# Patient Record
Sex: FEMALE | Race: ASIAN | NOT HISPANIC OR LATINO | URBAN - METROPOLITAN AREA
[De-identification: names, ages, dates, MRNs, and addresses within clinical notes are randomized per-mention and may not be internally consistent; named-entity substitution may affect disease eponyms.]

---

## 2023-03-08 ENCOUNTER — EMERGENCY (EMERGENCY)
Facility: HOSPITAL | Age: 33
LOS: 1 days | Discharge: STILL ADMITTED OR ADMINISTRATIVE | End: 2023-03-08
Admitting: EMERGENCY MEDICINE
Payer: SELF-PAY

## 2023-03-08 ENCOUNTER — OUTPATIENT (OUTPATIENT)
Dept: INPATIENT UNIT | Facility: HOSPITAL | Age: 33
LOS: 1 days | Discharge: HOME | End: 2023-03-08
Payer: MEDICAID

## 2023-03-08 ENCOUNTER — ASOB RESULT (OUTPATIENT)
Age: 33
End: 2023-03-08

## 2023-03-08 ENCOUNTER — APPOINTMENT (OUTPATIENT)
Dept: ANTEPARTUM | Facility: CLINIC | Age: 33
End: 2023-03-08
Payer: MEDICAID

## 2023-03-08 VITALS — HEART RATE: 121 BPM | OXYGEN SATURATION: 99 %

## 2023-03-08 VITALS
RESPIRATION RATE: 16 BRPM | TEMPERATURE: 99 F | HEART RATE: 127 BPM | DIASTOLIC BLOOD PRESSURE: 70 MMHG | OXYGEN SATURATION: 100 % | SYSTOLIC BLOOD PRESSURE: 115 MMHG

## 2023-03-08 VITALS
SYSTOLIC BLOOD PRESSURE: 129 MMHG | RESPIRATION RATE: 18 BRPM | HEART RATE: 100 BPM | OXYGEN SATURATION: 100 % | TEMPERATURE: 98 F | DIASTOLIC BLOOD PRESSURE: 80 MMHG

## 2023-03-08 DIAGNOSIS — O26.899 OTHER SPECIFIED PREGNANCY RELATED CONDITIONS, UNSPECIFIED TRIMESTER: ICD-10-CM

## 2023-03-08 DIAGNOSIS — Z90.49 ACQUIRED ABSENCE OF OTHER SPECIFIED PARTS OF DIGESTIVE TRACT: Chronic | ICD-10-CM

## 2023-03-08 DIAGNOSIS — Z98.891 HISTORY OF UTERINE SCAR FROM PREVIOUS SURGERY: Chronic | ICD-10-CM

## 2023-03-08 LAB
ALBUMIN SERPL ELPH-MCNC: 3.7 G/DL — SIGNIFICANT CHANGE UP (ref 3.3–5)
ALP SERPL-CCNC: 88 U/L — SIGNIFICANT CHANGE UP (ref 40–120)
ALT FLD-CCNC: 19 U/L — SIGNIFICANT CHANGE UP (ref 4–33)
AMYLASE P1 CFR SERPL: 57 U/L — SIGNIFICANT CHANGE UP (ref 25–125)
ANION GAP SERPL CALC-SCNC: 12 MMOL/L — SIGNIFICANT CHANGE UP (ref 7–14)
APPEARANCE UR: ABNORMAL
APTT BLD: 29.9 SEC — SIGNIFICANT CHANGE UP (ref 27–36.3)
AST SERPL-CCNC: 25 U/L — SIGNIFICANT CHANGE UP (ref 4–32)
BACTERIA # UR AUTO: ABNORMAL
BASOPHILS # BLD AUTO: 0.03 K/UL — SIGNIFICANT CHANGE UP (ref 0–0.2)
BASOPHILS NFR BLD AUTO: 0.4 % — SIGNIFICANT CHANGE UP (ref 0–2)
BILIRUB SERPL-MCNC: 0.3 MG/DL — SIGNIFICANT CHANGE UP (ref 0.2–1.2)
BILIRUB UR-MCNC: ABNORMAL
BUN SERPL-MCNC: 11 MG/DL — SIGNIFICANT CHANGE UP (ref 7–23)
CALCIUM SERPL-MCNC: 8.9 MG/DL — SIGNIFICANT CHANGE UP (ref 8.4–10.5)
CHLORIDE SERPL-SCNC: 99 MMOL/L — SIGNIFICANT CHANGE UP (ref 98–107)
CO2 SERPL-SCNC: 22 MMOL/L — SIGNIFICANT CHANGE UP (ref 22–31)
COLOR SPEC: YELLOW — SIGNIFICANT CHANGE UP
CREAT ?TM UR-MCNC: 315 MG/DL — SIGNIFICANT CHANGE UP
CREAT SERPL-MCNC: 0.54 MG/DL — SIGNIFICANT CHANGE UP (ref 0.5–1.3)
DIFF PNL FLD: NEGATIVE — SIGNIFICANT CHANGE UP
EGFR: 125 ML/MIN/1.73M2 — SIGNIFICANT CHANGE UP
EOSINOPHIL # BLD AUTO: 0 K/UL — SIGNIFICANT CHANGE UP (ref 0–0.5)
EOSINOPHIL NFR BLD AUTO: 0 % — SIGNIFICANT CHANGE UP (ref 0–6)
EPI CELLS # UR: 10 /HPF — HIGH (ref 0–5)
FIBRINOGEN PPP-MCNC: 585 MG/DL — HIGH (ref 200–465)
GLUCOSE SERPL-MCNC: 104 MG/DL — HIGH (ref 70–99)
GLUCOSE UR QL: NEGATIVE — SIGNIFICANT CHANGE UP
HCT VFR BLD CALC: 36.5 % — SIGNIFICANT CHANGE UP (ref 34.5–45)
HGB BLD-MCNC: 11.9 G/DL — SIGNIFICANT CHANGE UP (ref 11.5–15.5)
HYALINE CASTS # UR AUTO: 13 /LPF — HIGH (ref 0–7)
IANC: 6.09 K/UL — SIGNIFICANT CHANGE UP (ref 1.8–7.4)
IMM GRANULOCYTES NFR BLD AUTO: 1.2 % — HIGH (ref 0–0.9)
INR BLD: 1.08 RATIO — SIGNIFICANT CHANGE UP (ref 0.88–1.16)
KETONES UR-MCNC: ABNORMAL
LDH SERPL L TO P-CCNC: 138 U/L — SIGNIFICANT CHANGE UP (ref 135–225)
LEUKOCYTE ESTERASE UR-ACNC: NEGATIVE — SIGNIFICANT CHANGE UP
LIDOCAIN IGE QN: 29 U/L — SIGNIFICANT CHANGE UP (ref 7–60)
LYMPHOCYTES # BLD AUTO: 0.9 K/UL — LOW (ref 1–3.3)
LYMPHOCYTES # BLD AUTO: 12.2 % — LOW (ref 13–44)
MCHC RBC-ENTMCNC: 26.7 PG — LOW (ref 27–34)
MCHC RBC-ENTMCNC: 32.6 GM/DL — SIGNIFICANT CHANGE UP (ref 32–36)
MCV RBC AUTO: 82 FL — SIGNIFICANT CHANGE UP (ref 80–100)
MONOCYTES # BLD AUTO: 0.25 K/UL — SIGNIFICANT CHANGE UP (ref 0–0.9)
MONOCYTES NFR BLD AUTO: 3.4 % — SIGNIFICANT CHANGE UP (ref 2–14)
NEUTROPHILS # BLD AUTO: 6.09 K/UL — SIGNIFICANT CHANGE UP (ref 1.8–7.4)
NEUTROPHILS NFR BLD AUTO: 82.8 % — HIGH (ref 43–77)
NITRITE UR-MCNC: NEGATIVE — SIGNIFICANT CHANGE UP
NRBC # BLD: 0 /100 WBCS — SIGNIFICANT CHANGE UP (ref 0–0)
NRBC # FLD: 0 K/UL — SIGNIFICANT CHANGE UP (ref 0–0)
PH UR: 6 — SIGNIFICANT CHANGE UP (ref 5–8)
PLATELET # BLD AUTO: 266 K/UL — SIGNIFICANT CHANGE UP (ref 150–400)
POTASSIUM SERPL-MCNC: 4 MMOL/L — SIGNIFICANT CHANGE UP (ref 3.5–5.3)
POTASSIUM SERPL-SCNC: 4 MMOL/L — SIGNIFICANT CHANGE UP (ref 3.5–5.3)
PROT ?TM UR-MCNC: 55 MG/DL — SIGNIFICANT CHANGE UP
PROT ?TM UR-MCNC: 55 MG/DL — SIGNIFICANT CHANGE UP
PROT SERPL-MCNC: 7.6 G/DL — SIGNIFICANT CHANGE UP (ref 6–8.3)
PROT UR-MCNC: ABNORMAL
PROT/CREAT UR-RTO: 0.2 RATIO — SIGNIFICANT CHANGE UP (ref 0–0.2)
PROTHROM AB SERPL-ACNC: 12.5 SEC — SIGNIFICANT CHANGE UP (ref 10.5–13.4)
RBC # BLD: 4.45 M/UL — SIGNIFICANT CHANGE UP (ref 3.8–5.2)
RBC # FLD: 13.9 % — SIGNIFICANT CHANGE UP (ref 10.3–14.5)
RBC CASTS # UR COMP ASSIST: 6 /HPF — HIGH (ref 0–4)
SODIUM SERPL-SCNC: 133 MMOL/L — LOW (ref 135–145)
SP GR SPEC: 1.03 — SIGNIFICANT CHANGE UP (ref 1.01–1.05)
URATE SERPL-MCNC: 5.6 MG/DL — SIGNIFICANT CHANGE UP (ref 2.5–7)
UROBILINOGEN FLD QL: ABNORMAL
WBC # BLD: 7.36 K/UL — SIGNIFICANT CHANGE UP (ref 3.8–10.5)
WBC # FLD AUTO: 7.36 K/UL — SIGNIFICANT CHANGE UP (ref 3.8–10.5)
WBC UR QL: 6 /HPF — HIGH (ref 0–5)

## 2023-03-08 PROCEDURE — 99222 1ST HOSP IP/OBS MODERATE 55: CPT

## 2023-03-08 PROCEDURE — 93010 ELECTROCARDIOGRAM REPORT: CPT

## 2023-03-08 PROCEDURE — 76817 TRANSVAGINAL US OBSTETRIC: CPT | Mod: 26

## 2023-03-08 PROCEDURE — L9996: CPT

## 2023-03-08 PROCEDURE — 76815 OB US LIMITED FETUS(S): CPT | Mod: 26

## 2023-03-08 RX ORDER — SIMETHICONE 80 MG/1
160 TABLET, CHEWABLE ORAL ONCE
Refills: 0 | Status: COMPLETED | OUTPATIENT
Start: 2023-03-08 | End: 2023-03-08

## 2023-03-08 RX ORDER — SODIUM CHLORIDE 9 MG/ML
1000 INJECTION, SOLUTION INTRAVENOUS ONCE
Refills: 0 | Status: DISCONTINUED | OUTPATIENT
Start: 2023-03-08 | End: 2023-03-22

## 2023-03-08 RX ORDER — ONDANSETRON 8 MG/1
4 TABLET, FILM COATED ORAL ONCE
Refills: 0 | Status: COMPLETED | OUTPATIENT
Start: 2023-03-08 | End: 2023-03-08

## 2023-03-08 RX ORDER — SODIUM CHLORIDE 9 MG/ML
500 INJECTION, SOLUTION INTRAVENOUS
Refills: 0 | Status: DISCONTINUED | OUTPATIENT
Start: 2023-03-08 | End: 2023-03-08

## 2023-03-08 RX ORDER — SODIUM CHLORIDE 9 MG/ML
500 INJECTION, SOLUTION INTRAVENOUS ONCE
Refills: 0 | Status: COMPLETED | OUTPATIENT
Start: 2023-03-08 | End: 2023-03-08

## 2023-03-08 RX ORDER — ACETAMINOPHEN 500 MG
1000 TABLET ORAL ONCE
Refills: 0 | Status: COMPLETED | OUTPATIENT
Start: 2023-03-08 | End: 2023-03-08

## 2023-03-08 RX ORDER — FAMOTIDINE 10 MG/ML
20 INJECTION INTRAVENOUS ONCE
Refills: 0 | Status: COMPLETED | OUTPATIENT
Start: 2023-03-08 | End: 2023-03-08

## 2023-03-08 RX ADMIN — FAMOTIDINE 20 MILLIGRAM(S): 10 INJECTION INTRAVENOUS at 14:16

## 2023-03-08 RX ADMIN — SIMETHICONE 160 MILLIGRAM(S): 80 TABLET, CHEWABLE ORAL at 13:34

## 2023-03-08 RX ADMIN — Medication 1000 MILLIGRAM(S): at 14:16

## 2023-03-08 RX ADMIN — SODIUM CHLORIDE 1000 MILLILITER(S): 9 INJECTION, SOLUTION INTRAVENOUS at 12:44

## 2023-03-08 RX ADMIN — ONDANSETRON 4 MILLIGRAM(S): 8 TABLET, FILM COATED ORAL at 12:39

## 2023-03-08 NOTE — OB PROVIDER TRIAGE NOTE - NSPRENATALCARE_OBGYN_ALL_OB
PO#1  HVT  States doing better. Will try ambulation. Lumbar drain working well.   Will increase CSF drainage to 7.5 ml/hr Yes

## 2023-03-08 NOTE — ED ADULT TRIAGE NOTE - ARRIVAL FROM
Introductory lactation consult with Nick, Aly and baby Kaz for first feeding at breast. Nick has been pumping and manually expressing since delivery and expressing good volumes of as much as 7ml. Kaz latched eagerly off and on with assistance by LC for latching for the first half of the feeding gradually becoming more organized and independently latching with mother's assistance. He was awake and alert for almost the entire feeding. Parents expressed desire to be sure Aly gets some of the milk Nick has expressed at this time, therefore writer assisted with syringing drops of milk at breast throughout feeding, which stimulated long suckling bursts.  Discussed typical expectations and recommendations, answering many questions. Aly very attentive and asking many appropriate questions as well as providing hands on help during feeding for Nick. See Infant education record.  Provided Lactation Support Services introductory packet with Lactation Consultant contact information. Encouraged to continue pumping following feedings to assure of adequate breast milk supply stimulation until infant is latching more consistently. Encouraged to call with questions, concerns or for assistance with feedings as needed or desired.  Luiz Roblero RN, IBCLC    1345 Returned to room briefly to review Lactation Services packet, Infant Driven Feeding and assess if they have any further questions. Reviewed with Aly as Nick was in the bathroom. Also confirmed that Nick has a good breast pump for homoe use.  Writer will follow-up tomorrow to discuss further.    Home

## 2023-03-08 NOTE — ED ADULT TRIAGE NOTE - CHIEF COMPLAINT QUOTE
Pt states she is 21 weeks pregnant complaining of back and abdominal pain with vomiting. Pt denies chest pain, sob, fever or chills.

## 2023-03-08 NOTE — OB PROVIDER TRIAGE NOTE - ADDITIONAL INSTRUCTIONS
33y/o  21 2/ female with dehydration, ruled out for acute abdomen, gastroenteritis, PEC, UTI, discharged to home.  - Dr. Harvey discussed with Dr. Whittington.  - Patient to be discharged home with follow up and return precautions  - Please follow up with your obstetrician at your next scheduled appointment 3/21.  - Please return for decreased/no fetal movement, vaginal bleeding similar to that of a period, leaking/gush of fluid, regular contractions.  - Patient and partner and educated of plan and demonstrate understanding. All questions answered. Discharge instructions provided and signed.  - urine culture sent, result to be reviewed with patient   - Discharged at 1507

## 2023-03-08 NOTE — OB PROVIDER TRIAGE NOTE - HISTORY OF PRESENT ILLNESS
31y/o  at 21.2 weeks presents with c/o back pain, abdominal pain, and x3 episodes of vomiting since last nite. Pt reports 9/10 on pain scale, denies need for pain management currently. Pt denies taking medication for pain. Pt reports x1 episode chills last night. Pt last ate burger and fries yesterday afternoon. Pt denies fever, palpitations, dysuria, hematuria, urinary frequency. Denies headache, dizziness, visual changes. Denies lof, vb, reports positive fm.  -Pt receives PNC with Dr. Shelli Snyder in [place].    AP course:  - GDMA2- metformin 500mg BID (took yesterday afternoon) 31y/o  at 21.2 weeks presents with c/o back pain, abdominal pain, and x3 episodes of vomiting since last nite. Pt reports 9/10 on pain scale, denies need for pain management currently. Pt denies taking medication for pain. Pt reports x1 episode chills last night. Pt last ate burger and fries yesterday afternoon. Pt denies fever, diarrhea, palpitations, dysuria, hematuria, urinary frequency. Pt able to pass gas w/o difficulty. Denies headache, dizziness, visual changes. Denies lof, vb, reports positive fm.  -Pt receives PNC with Dr. Shelli Snyder in [place].    AP course:  - GDMA2- metformin 500mg BID (took yesterday afternoon) Prenatal care: Dr. Shelli Snyder in [place].    33y/o  at 21.2 weeks presents with c/o back pain, abdominal pain, and x3 episodes of vomiting since last nite. Pt reports 9/10 on pain scale, denies need for pain management currently. Pt denies taking medication for pain. Pt reports x1 episode chills last night. Pt last ate burger and fries yesterday afternoon. Pt denies fever, diarrhea, palpitations, dysuria, hematuria, urinary frequency. Pt able to pass gas w/o difficulty. Denies headache, dizziness, visual changes. Denies lof, vb, reports positive fm.    AP course:  - GDMA2- metformin 500mg BID (took yesterday afternoon)

## 2023-03-08 NOTE — OB PROVIDER TRIAGE NOTE - NSOBPROVIDERNOTE_OBGYN_ALL_OB_FT
33 y/o  at 21.2 weeks, for r/o PTL.  -r/o dehydration  -r/o acute abdomen  -r/o gastroenteritis  -r/o PEC  -r/o UTI    -UA  -EKG reveals sinus tachycardia  -LR Bolus 500cc  -Zofran  -HELLP labs  -amylase/lipase 31 y/o  at 21.2 weeks with cramping.  -r/o dehydration  -r/o acute abdomen  -r/o gastroenteritis  -r/o PEC  -r/o UTI    -no evidence of pre term labor    -UA  -EKG reveals sinus tachycardia  -LR Bolus 500cc  -Zofran  -HELLP labs  -amylase/lipase  -bp monitoring 31 y/o  at 21.2 weeks with cramping.  -r/o dehydration  -r/o acute abdomen  -r/o gastroenteritis  -r/o PEC  -r/o UTI    -no evidence of pre term labor    -UA  -EKG reveals sinus tachycardia  -LR Bolus 500cc  -Zofran  -HELLP labs  -amylase/lipase  -bp monitoring    -pt reports sharp pain under left breast.  -for simethicone 31 y/o  at 21.2 weeks with cramping.  -r/o dehydration  -r/o acute abdomen  -r/o gastroenteritis  -r/o PEC  -r/o UTI    -no evidence of pre term labor    -UA/UC  -EKG reveals sinus tachycardia  -LR Bolus 500cc  -Zofran  -HELLP labs  -amylase/lipase  -bp monitoring    -pt reports sharp pain under left breast; in no acute distress  -for simethicone  -for PO Challenge 33 y/o  at 21.2 weeks with cramping.  -r/o dehydration  -r/o acute abdomen  -r/o gastroenteritis  -r/o PEC  -r/o UTI    -no evidence of pre term labor or acute abdomen.    -UA/UC  -EKG reveals sinus tachycardia  -LR Bolus 500cc  -Zofran  -HELLP labs  -amylase/lipase  -bp monitoring    -pt reports sharp pain under left breast; in no acute distress  -for simethicone    Pt presented to [facility] (PGY-3)  -pain 6/10 with improvement  -for pepcid, tylenol  -for PO Challenge  -plan for d.c when s/s improve.  -[facility] d/w Dr. Whittington. 31 y/o  at 21.2 weeks with cramping and tachycardia.  -r/o dehydration  -r/o acute abdomen  -r/o gastroenteritis  -r/o PEC  -r/o UTI    -no evidence of pre term labor or acute abdomen.  -likely dehydration    -UA/UC  -EKG reveals sinus tachycardia  -LR Bolus 500cc  -Zofran  -HELLP labs  -amylase/lipase  -bp monitoring    -pt reports sharp pain under left breast; in no acute distress  -for simethicone    Pt presented to [facility] (PGY-3)  -pain 6/10 with improvement  -for pepcid, tylenol  -for PO Challenge  -plan for d.c home with f/u, when s/s improve.  -MD [facility] d/w Dr. Whittington. 33y/o  female is to be ruled out for pre term labor, acute abdomen, gastroenteritis, PEC, UTI  -UA/UC  -EKG reveals sinus tachycardia  -LR Bolus 500cc  -Zofran, nausea improved  -HELLP labs, normal findings  -blood pressure monitoring, within normal range  -amylase/lipase    1334 Patient reports sharp pain under left breast; in no acute distress  -for simethicone, patient reports of relief after medication administration    1416 Pt assessment, presentation and results to  (PGY-3)  -for pepcid, tylenol  -for PO challenge, patient able to tolerate solid  -plan for d.c home with f/u, when s/s improve.    33y/o  female with dehydration, ruled out for acute abdomen, gastroenteritis, PEC, UTI, discharged to home.  - Dr. Harvey discussed with Dr. Whittington.  - Patient to be discharged home with follow up and return precautions  - Please follow up with your obstetrician at your next scheduled appointment 3/21.  - Please return for decreased/no fetal movement, vaginal bleeding similar to that of a period, leaking/gush of fluid, regular contractions.  - Patient and partner and educated of plan and demonstrate understanding. All questions answered. Discharge instructions provided and signed.   - urine culture sent, result to be reviewed with patient  - Discharged at 1507

## 2023-03-08 NOTE — OB PROVIDER TRIAGE NOTE - NSHPPHYSICALEXAM_GEN_ALL_CORE
Vital Signs Last 24 Hrs  T(C): 37.0 (08 Mar 2023 11:27), Max: 37 (08 Mar 2023 10:58)  T(F): 98.6 (08 Mar 2023 11:27), Max: 98.6 (08 Mar 2023 10:58)  HR: 121 (08 Mar 2023 12:25) (100 - 148)  BP: 116/73 (08 Mar 2023 12:13) (109/74 - 129/80)  BP(mean): --  RR: 16 (08 Mar 2023 10:58) (16 - 18)  SpO2: 100% (08 Mar 2023 12:25) (93% - 100%)    abdomen soft, nontender  negative B/L CVA tenderness  TAS:  SSE:  TVS: Vital Signs Last 24 Hrs  T(C): 37.0 (08 Mar 2023 11:27), Max: 37 (08 Mar 2023 10:58)  T(F): 98.6 (08 Mar 2023 11:27), Max: 98.6 (08 Mar 2023 10:58)  HR: 121 (08 Mar 2023 12:25) (100 - 148)  BP: 116/73 (08 Mar 2023 12:13) (109/74 - 129/80)  BP(mean): --  RR: 16 (08 Mar 2023 10:58) (16 - 18)  SpO2: 100% (08 Mar 2023 12:25) (93% - 100%)    abdomen soft, nontender  negative B/L CVA tenderness  TAS: breech presentation, anterior placenta, FH 175bpm, MVP 3.15  SSE: cervix appears closed, no vb  TVS: CL 4.5cm, no funneling. Vital Signs Last 24 Hrs  T(C): 37.0 (08 Mar 2023 11:27), Max: 37 (08 Mar 2023 10:58)  T(F): 98.6 (08 Mar 2023 11:27), Max: 98.6 (08 Mar 2023 10:58)  HR: 121 (08 Mar 2023 12:25) (100 - 148)  BP: 116/73 (08 Mar 2023 12:13) (109/74 - 129/80)  BP(mean): --  RR: 16 (08 Mar 2023 10:58) (16 - 18)  SpO2: 100% (08 Mar 2023 12:25) (93% - 100%)    abdomen soft, nontender  negative B/L CVA tenderness  TAS: report in ASOB, breech presentation, anterior placenta, FH 175bpm, MVP 3.15  SSE: cervix appears closed, no vb  TVS: CL 4.5cm, no funneling. Vital Signs Last 24 Hrs  T(C): 37.0 (08 Mar 2023 11:27), Max: 37 (08 Mar 2023 10:58)  T(F): 98.6 (08 Mar 2023 11:27), Max: 98.6 (08 Mar 2023 10:58)  HR: 121 (08 Mar 2023 15:00) (100 - 148)  BP: 109/59 (08 Mar 2023 14:53) (103/55 - 129/80)  BP(mean): --  RR: 16 (08 Mar 2023 10:58) (16 - 18)  SpO2: 99% (08 Mar 2023 14:55) (88% - 100%)    abdomen soft, nontender  negative B/L CVA tenderness  lungs clear, equal B/L  HR normal rate and rhythm.  TAS: report in ASOB, breech presentation, anterior placenta, FH 175bpm, MVP 3.15  SSE: cervix appears closed, no vb  TVS: CL 4.5cm, no funneling.  EKG: sinus tachycardia

## 2023-03-08 NOTE — OB PROVIDER TRIAGE NOTE - NSHPLABSRESULTS_GEN_ALL_CORE
EKG CBC Full  -  ( 08 Mar 2023 12:37 )  WBC Count : 7.36 K/uL  RBC Count : 4.45 M/uL  Hemoglobin : 11.9 g/dL  Hematocrit : 36.5 %  Platelet Count - Automated : 266 K/uL  Mean Cell Volume : 82.0 fL  Mean Cell Hemoglobin : 26.7 pg  Mean Cell Hemoglobin Concentration : 32.6 gm/dL  Auto Neutrophil # : 6.09 K/uL  Auto Lymphocyte # : 0.90 K/uL  Auto Monocyte # : 0.25 K/uL  Auto Eosinophil # : 0.00 K/uL  Auto Basophil # : 0.03 K/uL  Auto Neutrophil % : 82.8 %  Auto Lymphocyte % : 12.2 %  Auto Monocyte % : 3.4 %  Auto Eosinophil % : 0.0 %  Auto Basophil % : 0.4 %    Urinalysis Basic - ( 08 Mar 2023 11:55 )    Color: Yellow / Appearance: Slightly Turbid / S.035 / pH: x  Gluc: x / Ketone: Moderate  / Bili: Small / Urobili: 3 mg/dL   Blood: x / Protein: 100 mg/dL / Nitrite: Negative   Leuk Esterase: Negative / RBC: 6 /HPF / WBC 6 /HPF   Sq Epi: x / Non Sq Epi: 10 /HPF / Bacteria: Few    03-08    133<L>  |  99  |  11  ----------------------------<  104<H>  4.0   |  22  |  0.54    Ca    8.9      08 Mar 2023 12:37    TPro  7.6  /  Alb  3.7  /  TBili  0.3  /  DBili  x   /  AST  25  /  ALT  19  /  AlkPhos  88  03-08    amylase: 57   lipase: 29

## 2023-03-08 NOTE — OB RN TRIAGE NOTE - FALL HARM RISK - UNIVERSAL INTERVENTIONS
Bed in lowest position, wheels locked, appropriate side rails in place/Call bell, personal items and telephone in reach/Instruct patient to call for assistance before getting out of bed or chair/Non-slip footwear when patient is out of bed/[place] to call system/Physically safe environment - no spills, clutter or unnecessary equipment/Purposeful Proactive Rounding/Room/bathroom lighting operational, light cord in reach

## 2023-03-09 DIAGNOSIS — E86.0 DEHYDRATION: ICD-10-CM

## 2023-03-09 DIAGNOSIS — O99.282 ENDOCRINE, NUTRITIONAL AND METABOLIC DISEASES COMPLICATING PREGNANCY, SECOND TRIMESTER: ICD-10-CM

## 2023-03-09 DIAGNOSIS — O99.612 DISEASES OF THE DIGESTIVE SYSTEM COMPLICATING PREGNANCY, SECOND TRIMESTER: ICD-10-CM

## 2023-03-09 DIAGNOSIS — E03.9 HYPOTHYROIDISM, UNSPECIFIED: ICD-10-CM

## 2023-03-09 DIAGNOSIS — O24.415 GESTATIONAL DIABETES MELLITUS IN PREGNANCY, CONTROLLED BY ORAL HYPOGLYCEMIC DRUGS: ICD-10-CM

## 2023-03-09 DIAGNOSIS — R00.0 TACHYCARDIA, UNSPECIFIED: ICD-10-CM

## 2023-03-09 DIAGNOSIS — Z3A.21 21 WEEKS GESTATION OF PREGNANCY: ICD-10-CM

## 2023-03-09 DIAGNOSIS — O26.892 OTHER SPECIFIED PREGNANCY RELATED CONDITIONS, SECOND TRIMESTER: ICD-10-CM

## 2023-03-09 DIAGNOSIS — K52.9 NONINFECTIVE GASTROENTERITIS AND COLITIS, UNSPECIFIED: ICD-10-CM

## 2023-03-09 DIAGNOSIS — O99.891 OTHER SPECIFIED DISEASES AND CONDITIONS COMPLICATING PREGNANCY: ICD-10-CM

## 2023-03-09 DIAGNOSIS — M54.9 DORSALGIA, UNSPECIFIED: ICD-10-CM

## 2023-03-09 LAB
CULTURE RESULTS: SIGNIFICANT CHANGE UP
SPECIMEN SOURCE: SIGNIFICANT CHANGE UP

## 2023-04-19 PROBLEM — Z00.00 ENCOUNTER FOR PREVENTIVE HEALTH EXAMINATION: Status: ACTIVE | Noted: 2023-04-19

## 2023-05-22 NOTE — OB RN TRIAGE NOTE - NS_GESTAGE_OBGYN_ALL_OB_FT
Patient will dress upper body with minimal assistance in 2-4 sessions. Patient will feed self with supervision and set-up in 2-4 sessions.
21w2d

## 2023-06-08 PROCEDURE — 99283 EMERGENCY DEPT VISIT LOW MDM: CPT

## 2023-06-09 ENCOUNTER — OUTPATIENT (OUTPATIENT)
Dept: OUTPATIENT SERVICES | Facility: HOSPITAL | Age: 33
LOS: 1 days | End: 2023-06-09
Payer: MEDICAID

## 2023-06-09 DIAGNOSIS — Z98.891 HISTORY OF UTERINE SCAR FROM PREVIOUS SURGERY: Chronic | ICD-10-CM

## 2023-06-09 DIAGNOSIS — Z90.49 ACQUIRED ABSENCE OF OTHER SPECIFIED PARTS OF DIGESTIVE TRACT: Chronic | ICD-10-CM

## 2023-06-09 DIAGNOSIS — Z3A.00 WEEKS OF GESTATION OF PREGNANCY NOT SPECIFIED: ICD-10-CM

## 2023-06-09 DIAGNOSIS — O26.899 OTHER SPECIFIED PREGNANCY RELATED CONDITIONS, UNSPECIFIED TRIMESTER: ICD-10-CM

## 2023-06-09 PROBLEM — E03.9 HYPOTHYROIDISM, UNSPECIFIED: Chronic | Status: ACTIVE | Noted: 2023-03-08

## 2023-06-09 PROBLEM — J45.998 OTHER ASTHMA: Chronic | Status: ACTIVE | Noted: 2023-03-08

## 2023-06-09 LAB
ALBUMIN SERPL ELPH-MCNC: 2.3 G/DL — LOW (ref 3.5–5)
ALP SERPL-CCNC: 117 U/L — SIGNIFICANT CHANGE UP (ref 40–120)
ALT FLD-CCNC: 14 U/L DA — SIGNIFICANT CHANGE UP (ref 10–60)
ANION GAP SERPL CALC-SCNC: 4 MMOL/L — LOW (ref 5–17)
APPEARANCE UR: CLEAR — SIGNIFICANT CHANGE UP
APTT BLD: 33.4 SEC — SIGNIFICANT CHANGE UP (ref 27.5–35.5)
APTT BLD: SIGNIFICANT CHANGE UP SEC (ref 27.5–35.5)
AST SERPL-CCNC: 14 U/L — SIGNIFICANT CHANGE UP (ref 10–40)
BACTERIA # UR AUTO: ABNORMAL /HPF
BASOPHILS # BLD AUTO: 0.03 K/UL — SIGNIFICANT CHANGE UP (ref 0–0.2)
BASOPHILS NFR BLD AUTO: 0.3 % — SIGNIFICANT CHANGE UP (ref 0–2)
BILIRUB SERPL-MCNC: 0.2 MG/DL — SIGNIFICANT CHANGE UP (ref 0.2–1.2)
BILIRUB UR-MCNC: NEGATIVE — SIGNIFICANT CHANGE UP
BUN SERPL-MCNC: 10 MG/DL — SIGNIFICANT CHANGE UP (ref 7–18)
CALCIUM SERPL-MCNC: 8.7 MG/DL — SIGNIFICANT CHANGE UP (ref 8.4–10.5)
CHLORIDE SERPL-SCNC: 111 MMOL/L — HIGH (ref 96–108)
CO2 SERPL-SCNC: 23 MMOL/L — SIGNIFICANT CHANGE UP (ref 22–31)
COLOR SPEC: YELLOW — SIGNIFICANT CHANGE UP
CREAT ?TM UR-MCNC: 123 MG/DL — SIGNIFICANT CHANGE UP
CREAT SERPL-MCNC: 0.5 MG/DL — SIGNIFICANT CHANGE UP (ref 0.5–1.3)
DIFF PNL FLD: NEGATIVE — SIGNIFICANT CHANGE UP
EGFR: 128 ML/MIN/1.73M2 — SIGNIFICANT CHANGE UP
EOSINOPHIL # BLD AUTO: 0.16 K/UL — SIGNIFICANT CHANGE UP (ref 0–0.5)
EOSINOPHIL NFR BLD AUTO: 1.7 % — SIGNIFICANT CHANGE UP (ref 0–6)
EPI CELLS # UR: ABNORMAL /HPF
FIBRINOGEN PPP-MCNC: SIGNIFICANT CHANGE UP MG/DL (ref 200–475)
GLUCOSE BLDC GLUCOMTR-MCNC: 90 MG/DL — SIGNIFICANT CHANGE UP (ref 70–99)
GLUCOSE SERPL-MCNC: 88 MG/DL — SIGNIFICANT CHANGE UP (ref 70–99)
GLUCOSE UR QL: NEGATIVE — SIGNIFICANT CHANGE UP
HCT VFR BLD CALC: 34.7 % — SIGNIFICANT CHANGE UP (ref 34.5–45)
HGB BLD-MCNC: 10.9 G/DL — LOW (ref 11.5–15.5)
IMM GRANULOCYTES NFR BLD AUTO: 0.5 % — SIGNIFICANT CHANGE UP (ref 0–0.9)
INR BLD: 0.92 RATIO — SIGNIFICANT CHANGE UP (ref 0.88–1.16)
INR BLD: SIGNIFICANT CHANGE UP RATIO (ref 0.88–1.16)
KETONES UR-MCNC: NEGATIVE — SIGNIFICANT CHANGE UP
LDH SERPL L TO P-CCNC: 156 U/L — SIGNIFICANT CHANGE UP (ref 120–225)
LEUKOCYTE ESTERASE UR-ACNC: ABNORMAL
LYMPHOCYTES # BLD AUTO: 2.57 K/UL — SIGNIFICANT CHANGE UP (ref 1–3.3)
LYMPHOCYTES # BLD AUTO: 27.3 % — SIGNIFICANT CHANGE UP (ref 13–44)
MCHC RBC-ENTMCNC: 27.2 PG — SIGNIFICANT CHANGE UP (ref 27–34)
MCHC RBC-ENTMCNC: 31.4 GM/DL — LOW (ref 32–36)
MCV RBC AUTO: 86.5 FL — SIGNIFICANT CHANGE UP (ref 80–100)
MONOCYTES # BLD AUTO: 0.52 K/UL — SIGNIFICANT CHANGE UP (ref 0–0.9)
MONOCYTES NFR BLD AUTO: 5.5 % — SIGNIFICANT CHANGE UP (ref 2–14)
NEUTROPHILS # BLD AUTO: 6.08 K/UL — SIGNIFICANT CHANGE UP (ref 1.8–7.4)
NEUTROPHILS NFR BLD AUTO: 64.7 % — SIGNIFICANT CHANGE UP (ref 43–77)
NITRITE UR-MCNC: NEGATIVE — SIGNIFICANT CHANGE UP
NRBC # BLD: 0 /100 WBCS — SIGNIFICANT CHANGE UP (ref 0–0)
PH UR: 6 — SIGNIFICANT CHANGE UP (ref 5–8)
PLATELET # BLD AUTO: 195 K/UL — SIGNIFICANT CHANGE UP (ref 150–400)
POTASSIUM SERPL-MCNC: 4 MMOL/L — SIGNIFICANT CHANGE UP (ref 3.5–5.3)
POTASSIUM SERPL-SCNC: 4 MMOL/L — SIGNIFICANT CHANGE UP (ref 3.5–5.3)
PROT ?TM UR-MCNC: 24 MG/DL — HIGH (ref 0–12)
PROT SERPL-MCNC: 6.6 G/DL — SIGNIFICANT CHANGE UP (ref 6–8.3)
PROT UR-MCNC: 30 MG/DL
PROTHROM AB SERPL-ACNC: 10.9 SEC — SIGNIFICANT CHANGE UP (ref 10.5–13.4)
PROTHROM AB SERPL-ACNC: SIGNIFICANT CHANGE UP SEC (ref 10.5–13.4)
RBC # BLD: 4.01 M/UL — SIGNIFICANT CHANGE UP (ref 3.8–5.2)
RBC # FLD: 13.2 % — SIGNIFICANT CHANGE UP (ref 10.3–14.5)
RBC CASTS # UR COMP ASSIST: SIGNIFICANT CHANGE UP /HPF (ref 0–2)
SODIUM SERPL-SCNC: 138 MMOL/L — SIGNIFICANT CHANGE UP (ref 135–145)
SP GR SPEC: 1.02 — SIGNIFICANT CHANGE UP (ref 1.01–1.02)
URATE SERPL-MCNC: 6 MG/DL — SIGNIFICANT CHANGE UP (ref 2.5–7)
UROBILINOGEN FLD QL: NEGATIVE — SIGNIFICANT CHANGE UP
WBC # BLD: 9.41 K/UL — SIGNIFICANT CHANGE UP (ref 3.8–10.5)
WBC # FLD AUTO: 9.41 K/UL — SIGNIFICANT CHANGE UP (ref 3.8–10.5)
WBC UR QL: SIGNIFICANT CHANGE UP /HPF (ref 0–5)

## 2023-06-10 LAB
COLLECT DURATION TIME UR: 24 HR — SIGNIFICANT CHANGE UP
PROT 24H UR-MRATE: 264 MG/24 H — HIGH (ref 50–100)
TOTAL VOLUME - 24 HOUR: 2400 ML — SIGNIFICANT CHANGE UP
URINE CREATININE CALCULATION: 1.4 G/24 H — SIGNIFICANT CHANGE UP (ref 0.6–2.5)

## 2023-06-10 PROCEDURE — 85025 COMPLETE CBC W/AUTO DIFF WBC: CPT

## 2023-06-10 PROCEDURE — 84156 ASSAY OF PROTEIN URINE: CPT

## 2023-06-10 PROCEDURE — 83615 LACTATE (LD) (LDH) ENZYME: CPT

## 2023-06-10 PROCEDURE — 82570 ASSAY OF URINE CREATININE: CPT

## 2023-06-10 PROCEDURE — 59025 FETAL NON-STRESS TEST: CPT

## 2023-06-10 PROCEDURE — G0463: CPT

## 2023-06-10 PROCEDURE — 85610 PROTHROMBIN TIME: CPT

## 2023-06-10 PROCEDURE — 82962 GLUCOSE BLOOD TEST: CPT

## 2023-06-10 PROCEDURE — 80053 COMPREHEN METABOLIC PANEL: CPT

## 2023-06-10 PROCEDURE — 85384 FIBRINOGEN ACTIVITY: CPT

## 2023-06-10 PROCEDURE — 81001 URINALYSIS AUTO W/SCOPE: CPT

## 2023-06-10 PROCEDURE — 36415 COLL VENOUS BLD VENIPUNCTURE: CPT

## 2023-06-10 PROCEDURE — 84550 ASSAY OF BLOOD/URIC ACID: CPT

## 2023-06-10 PROCEDURE — 85730 THROMBOPLASTIN TIME PARTIAL: CPT

## 2023-06-10 NOTE — CHART NOTE - NSCHARTNOTEFT_GEN_A_CORE
pt  dropped off 24hr urine   per  pt not available to come in today as she is busy, but is feeling well and admits to +fetal movement  per dr. england, accept 24hr urine as it already has pt label and send to lab even though pt not physically here in the building

## 2023-06-12 ENCOUNTER — OUTPATIENT (OUTPATIENT)
Dept: OUTPATIENT SERVICES | Facility: HOSPITAL | Age: 33
LOS: 1 days | End: 2023-06-12
Payer: MEDICAID

## 2023-06-12 DIAGNOSIS — Z3A.00 WEEKS OF GESTATION OF PREGNANCY NOT SPECIFIED: ICD-10-CM

## 2023-06-12 DIAGNOSIS — Z90.49 ACQUIRED ABSENCE OF OTHER SPECIFIED PARTS OF DIGESTIVE TRACT: Chronic | ICD-10-CM

## 2023-06-12 DIAGNOSIS — Z98.891 HISTORY OF UTERINE SCAR FROM PREVIOUS SURGERY: Chronic | ICD-10-CM

## 2023-06-12 DIAGNOSIS — O26.899 OTHER SPECIFIED PREGNANCY RELATED CONDITIONS, UNSPECIFIED TRIMESTER: ICD-10-CM

## 2023-06-12 LAB
ALBUMIN SERPL ELPH-MCNC: 2.1 G/DL — LOW (ref 3.5–5)
ALP SERPL-CCNC: 119 U/L — SIGNIFICANT CHANGE UP (ref 40–120)
ALT FLD-CCNC: 14 U/L DA — SIGNIFICANT CHANGE UP (ref 10–60)
ANION GAP SERPL CALC-SCNC: 4 MMOL/L — LOW (ref 5–17)
APPEARANCE UR: CLEAR — SIGNIFICANT CHANGE UP
APTT BLD: 29.6 SEC — SIGNIFICANT CHANGE UP (ref 27.5–35.5)
AST SERPL-CCNC: 19 U/L — SIGNIFICANT CHANGE UP (ref 10–40)
BASOPHILS # BLD AUTO: 0.04 K/UL — SIGNIFICANT CHANGE UP (ref 0–0.2)
BASOPHILS NFR BLD AUTO: 0.5 % — SIGNIFICANT CHANGE UP (ref 0–2)
BILIRUB SERPL-MCNC: 0.1 MG/DL — LOW (ref 0.2–1.2)
BILIRUB UR-MCNC: NEGATIVE — SIGNIFICANT CHANGE UP
BUN SERPL-MCNC: 9 MG/DL — SIGNIFICANT CHANGE UP (ref 7–18)
CALCIUM SERPL-MCNC: 9.3 MG/DL — SIGNIFICANT CHANGE UP (ref 8.4–10.5)
CHLORIDE SERPL-SCNC: 109 MMOL/L — HIGH (ref 96–108)
CO2 SERPL-SCNC: 25 MMOL/L — SIGNIFICANT CHANGE UP (ref 22–31)
COLOR SPEC: YELLOW — SIGNIFICANT CHANGE UP
CREAT ?TM UR-MCNC: 99 MG/DL — SIGNIFICANT CHANGE UP
CREAT SERPL-MCNC: 0.68 MG/DL — SIGNIFICANT CHANGE UP (ref 0.5–1.3)
DIFF PNL FLD: NEGATIVE — SIGNIFICANT CHANGE UP
EGFR: 119 ML/MIN/1.73M2 — SIGNIFICANT CHANGE UP
EOSINOPHIL # BLD AUTO: 0.13 K/UL — SIGNIFICANT CHANGE UP (ref 0–0.5)
EOSINOPHIL NFR BLD AUTO: 1.5 % — SIGNIFICANT CHANGE UP (ref 0–6)
GLUCOSE BLDC GLUCOMTR-MCNC: 108 MG/DL — HIGH (ref 70–99)
GLUCOSE SERPL-MCNC: 115 MG/DL — HIGH (ref 70–99)
GLUCOSE UR QL: NEGATIVE — SIGNIFICANT CHANGE UP
HCT VFR BLD CALC: 31 % — LOW (ref 34.5–45)
HGB BLD-MCNC: 10.3 G/DL — LOW (ref 11.5–15.5)
IMM GRANULOCYTES NFR BLD AUTO: 0.7 % — SIGNIFICANT CHANGE UP (ref 0–0.9)
INR BLD: 0.93 RATIO — SIGNIFICANT CHANGE UP (ref 0.88–1.16)
KETONES UR-MCNC: NEGATIVE — SIGNIFICANT CHANGE UP
LDH SERPL L TO P-CCNC: 159 U/L — SIGNIFICANT CHANGE UP (ref 120–225)
LEUKOCYTE ESTERASE UR-ACNC: ABNORMAL
LYMPHOCYTES # BLD AUTO: 2.28 K/UL — SIGNIFICANT CHANGE UP (ref 1–3.3)
LYMPHOCYTES # BLD AUTO: 25.7 % — SIGNIFICANT CHANGE UP (ref 13–44)
MCHC RBC-ENTMCNC: 26.7 PG — LOW (ref 27–34)
MCHC RBC-ENTMCNC: 33.2 GM/DL — SIGNIFICANT CHANGE UP (ref 32–36)
MCV RBC AUTO: 80.3 FL — SIGNIFICANT CHANGE UP (ref 80–100)
MONOCYTES # BLD AUTO: 0.56 K/UL — SIGNIFICANT CHANGE UP (ref 0–0.9)
MONOCYTES NFR BLD AUTO: 6.3 % — SIGNIFICANT CHANGE UP (ref 2–14)
NEUTROPHILS # BLD AUTO: 5.79 K/UL — SIGNIFICANT CHANGE UP (ref 1.8–7.4)
NEUTROPHILS NFR BLD AUTO: 65.3 % — SIGNIFICANT CHANGE UP (ref 43–77)
NITRITE UR-MCNC: NEGATIVE — SIGNIFICANT CHANGE UP
NRBC # BLD: 0 /100 WBCS — SIGNIFICANT CHANGE UP (ref 0–0)
PH UR: 6 — SIGNIFICANT CHANGE UP (ref 5–8)
PLATELET # BLD AUTO: 200 K/UL — SIGNIFICANT CHANGE UP (ref 150–400)
POTASSIUM SERPL-MCNC: 3.9 MMOL/L — SIGNIFICANT CHANGE UP (ref 3.5–5.3)
POTASSIUM SERPL-SCNC: 3.9 MMOL/L — SIGNIFICANT CHANGE UP (ref 3.5–5.3)
PROT ?TM UR-MCNC: 15 MG/DL — HIGH (ref 0–12)
PROT SERPL-MCNC: 6.5 G/DL — SIGNIFICANT CHANGE UP (ref 6–8.3)
PROT UR-MCNC: 15 MG/DL
PROTHROM AB SERPL-ACNC: 11 SEC — SIGNIFICANT CHANGE UP (ref 10.5–13.4)
RBC # BLD: 3.86 M/UL — SIGNIFICANT CHANGE UP (ref 3.8–5.2)
RBC # FLD: 13.4 % — SIGNIFICANT CHANGE UP (ref 10.3–14.5)
SODIUM SERPL-SCNC: 138 MMOL/L — SIGNIFICANT CHANGE UP (ref 135–145)
SP GR SPEC: 1.01 — SIGNIFICANT CHANGE UP (ref 1.01–1.02)
URATE SERPL-MCNC: 7.4 MG/DL — HIGH (ref 2.5–7)
UROBILINOGEN FLD QL: NEGATIVE — SIGNIFICANT CHANGE UP
WBC # BLD: 8.86 K/UL — SIGNIFICANT CHANGE UP (ref 3.8–10.5)
WBC # FLD AUTO: 8.86 K/UL — SIGNIFICANT CHANGE UP (ref 3.8–10.5)

## 2023-06-12 PROCEDURE — 36415 COLL VENOUS BLD VENIPUNCTURE: CPT

## 2023-06-12 PROCEDURE — 85610 PROTHROMBIN TIME: CPT

## 2023-06-12 PROCEDURE — 76819 FETAL BIOPHYS PROFIL W/O NST: CPT | Mod: 26

## 2023-06-12 PROCEDURE — 84156 ASSAY OF PROTEIN URINE: CPT

## 2023-06-12 PROCEDURE — 83615 LACTATE (LD) (LDH) ENZYME: CPT

## 2023-06-12 PROCEDURE — 80053 COMPREHEN METABOLIC PANEL: CPT

## 2023-06-12 PROCEDURE — 59025 FETAL NON-STRESS TEST: CPT

## 2023-06-12 PROCEDURE — 84550 ASSAY OF BLOOD/URIC ACID: CPT

## 2023-06-12 PROCEDURE — 76819 FETAL BIOPHYS PROFIL W/O NST: CPT

## 2023-06-12 PROCEDURE — 85025 COMPLETE CBC W/AUTO DIFF WBC: CPT

## 2023-06-12 PROCEDURE — 82570 ASSAY OF URINE CREATININE: CPT

## 2023-06-12 PROCEDURE — 83789 MASS SPECTROMETRY QUAL/QUAN: CPT

## 2023-06-12 PROCEDURE — G0463: CPT

## 2023-06-12 PROCEDURE — 82239 BILE ACIDS TOTAL: CPT

## 2023-06-12 PROCEDURE — 81001 URINALYSIS AUTO W/SCOPE: CPT

## 2023-06-12 PROCEDURE — 85730 THROMBOPLASTIN TIME PARTIAL: CPT

## 2023-06-12 PROCEDURE — 82962 GLUCOSE BLOOD TEST: CPT

## 2023-06-12 PROCEDURE — 85384 FIBRINOGEN ACTIVITY: CPT

## 2023-06-12 RX ORDER — SODIUM CHLORIDE 9 MG/ML
1000 INJECTION INTRAMUSCULAR; INTRAVENOUS; SUBCUTANEOUS ONCE
Refills: 0 | Status: COMPLETED | OUTPATIENT
Start: 2023-06-12 | End: 2023-06-12

## 2023-06-12 RX ORDER — ACETAMINOPHEN 500 MG
975 TABLET ORAL ONCE
Refills: 0 | Status: COMPLETED | OUTPATIENT
Start: 2023-06-12 | End: 2023-06-12

## 2023-06-12 RX ADMIN — Medication 975 MILLIGRAM(S): at 16:21

## 2023-06-12 RX ADMIN — SODIUM CHLORIDE 1000 MILLILITER(S): 9 INJECTION INTRAMUSCULAR; INTRAVENOUS; SUBCUTANEOUS at 16:28

## 2023-06-13 ENCOUNTER — EMERGENCY (EMERGENCY)
Facility: HOSPITAL | Age: 33
LOS: 1 days | Discharge: STILL ADMITTED OR ADMINISTRATIVE | End: 2023-06-13
Admitting: EMERGENCY MEDICINE
Payer: SELF-PAY

## 2023-06-13 ENCOUNTER — INPATIENT (INPATIENT)
Facility: HOSPITAL | Age: 33
LOS: 5 days | Discharge: HOME | End: 2023-06-19
Attending: SPECIALIST | Admitting: SPECIALIST
Payer: MEDICAID

## 2023-06-13 VITALS
OXYGEN SATURATION: 100 % | TEMPERATURE: 97 F | DIASTOLIC BLOOD PRESSURE: 103 MMHG | HEART RATE: 80 BPM | SYSTOLIC BLOOD PRESSURE: 159 MMHG | HEIGHT: 63 IN | RESPIRATION RATE: 16 BRPM

## 2023-06-13 VITALS — SYSTOLIC BLOOD PRESSURE: 151 MMHG | DIASTOLIC BLOOD PRESSURE: 81 MMHG | HEART RATE: 75 BPM

## 2023-06-13 DIAGNOSIS — O26.899 OTHER SPECIFIED PREGNANCY RELATED CONDITIONS, UNSPECIFIED TRIMESTER: ICD-10-CM

## 2023-06-13 DIAGNOSIS — Z90.49 ACQUIRED ABSENCE OF OTHER SPECIFIED PARTS OF DIGESTIVE TRACT: Chronic | ICD-10-CM

## 2023-06-13 DIAGNOSIS — Z98.891 HISTORY OF UTERINE SCAR FROM PREVIOUS SURGERY: Chronic | ICD-10-CM

## 2023-06-13 DIAGNOSIS — R03.0 ELEVATED BLOOD-PRESSURE READING, WITHOUT DIAGNOSIS OF HYPERTENSION: ICD-10-CM

## 2023-06-13 LAB
ALBUMIN SERPL ELPH-MCNC: 3.4 G/DL — SIGNIFICANT CHANGE UP (ref 3.3–5)
ALP SERPL-CCNC: 132 U/L — HIGH (ref 40–120)
ALT FLD-CCNC: 15 U/L — SIGNIFICANT CHANGE UP (ref 4–33)
ANION GAP SERPL CALC-SCNC: 12 MMOL/L — SIGNIFICANT CHANGE UP (ref 7–14)
APPEARANCE UR: CLEAR — SIGNIFICANT CHANGE UP
APTT BLD: 32.7 SEC — SIGNIFICANT CHANGE UP (ref 27–36.3)
AST SERPL-CCNC: 18 U/L — SIGNIFICANT CHANGE UP (ref 4–32)
BACTERIA # UR AUTO: NEGATIVE — SIGNIFICANT CHANGE UP
BASOPHILS # BLD AUTO: 0.03 K/UL — SIGNIFICANT CHANGE UP (ref 0–0.2)
BASOPHILS NFR BLD AUTO: 0.3 % — SIGNIFICANT CHANGE UP (ref 0–2)
BILIRUB SERPL-MCNC: <0.2 MG/DL — SIGNIFICANT CHANGE UP (ref 0.2–1.2)
BILIRUB UR-MCNC: NEGATIVE — SIGNIFICANT CHANGE UP
BLD GP AB SCN SERPL QL: NEGATIVE — SIGNIFICANT CHANGE UP
BUN SERPL-MCNC: 8 MG/DL — SIGNIFICANT CHANGE UP (ref 7–23)
CALCIUM SERPL-MCNC: 9.1 MG/DL — SIGNIFICANT CHANGE UP (ref 8.4–10.5)
CHLORIDE SERPL-SCNC: 101 MMOL/L — SIGNIFICANT CHANGE UP (ref 98–107)
CO2 SERPL-SCNC: 23 MMOL/L — SIGNIFICANT CHANGE UP (ref 22–31)
COLOR SPEC: SIGNIFICANT CHANGE UP
CREAT ?TM UR-MCNC: 111 MG/DL — SIGNIFICANT CHANGE UP
CREAT SERPL-MCNC: 0.7 MG/DL — SIGNIFICANT CHANGE UP (ref 0.5–1.3)
DIFF PNL FLD: NEGATIVE — SIGNIFICANT CHANGE UP
EGFR: 118 ML/MIN/1.73M2 — SIGNIFICANT CHANGE UP
EOSINOPHIL # BLD AUTO: 0.17 K/UL — SIGNIFICANT CHANGE UP (ref 0–0.5)
EOSINOPHIL NFR BLD AUTO: 1.7 % — SIGNIFICANT CHANGE UP (ref 0–6)
EPI CELLS # UR: 5 /HPF — SIGNIFICANT CHANGE UP (ref 0–5)
FIBRINOGEN PPP-MCNC: 549 MG/DL — HIGH (ref 200–465)
GLUCOSE SERPL-MCNC: 81 MG/DL — SIGNIFICANT CHANGE UP (ref 70–99)
GLUCOSE UR QL: NEGATIVE — SIGNIFICANT CHANGE UP
HCT VFR BLD CALC: 32.4 % — LOW (ref 34.5–45)
HGB BLD-MCNC: 11 G/DL — LOW (ref 11.5–15.5)
HIV 1+2 AB+HIV1 P24 AG SERPL QL IA: SIGNIFICANT CHANGE UP
HYALINE CASTS # UR AUTO: 1 /LPF — SIGNIFICANT CHANGE UP (ref 0–7)
IANC: 6.74 K/UL — SIGNIFICANT CHANGE UP (ref 1.8–7.4)
IMM GRANULOCYTES NFR BLD AUTO: 0.8 % — SIGNIFICANT CHANGE UP (ref 0–0.9)
INR BLD: 0.93 RATIO — SIGNIFICANT CHANGE UP (ref 0.88–1.16)
KETONES UR-MCNC: NEGATIVE — SIGNIFICANT CHANGE UP
LDH SERPL L TO P-CCNC: 176 U/L — SIGNIFICANT CHANGE UP (ref 135–225)
LEUKOCYTE ESTERASE UR-ACNC: ABNORMAL
LYMPHOCYTES # BLD AUTO: 2.46 K/UL — SIGNIFICANT CHANGE UP (ref 1–3.3)
LYMPHOCYTES # BLD AUTO: 24.3 % — SIGNIFICANT CHANGE UP (ref 13–44)
MCHC RBC-ENTMCNC: 27.2 PG — SIGNIFICANT CHANGE UP (ref 27–34)
MCHC RBC-ENTMCNC: 34 GM/DL — SIGNIFICANT CHANGE UP (ref 32–36)
MCV RBC AUTO: 80 FL — SIGNIFICANT CHANGE UP (ref 80–100)
MONOCYTES # BLD AUTO: 0.63 K/UL — SIGNIFICANT CHANGE UP (ref 0–0.9)
MONOCYTES NFR BLD AUTO: 6.2 % — SIGNIFICANT CHANGE UP (ref 2–14)
NEUTROPHILS # BLD AUTO: 6.74 K/UL — SIGNIFICANT CHANGE UP (ref 1.8–7.4)
NEUTROPHILS NFR BLD AUTO: 66.7 % — SIGNIFICANT CHANGE UP (ref 43–77)
NITRITE UR-MCNC: NEGATIVE — SIGNIFICANT CHANGE UP
NRBC # BLD: 0 /100 WBCS — SIGNIFICANT CHANGE UP (ref 0–0)
NRBC # FLD: 0 K/UL — SIGNIFICANT CHANGE UP (ref 0–0)
PH UR: 6.5 — SIGNIFICANT CHANGE UP (ref 5–8)
PLATELET # BLD AUTO: 217 K/UL — SIGNIFICANT CHANGE UP (ref 150–400)
POTASSIUM SERPL-MCNC: 4.3 MMOL/L — SIGNIFICANT CHANGE UP (ref 3.5–5.3)
POTASSIUM SERPL-SCNC: 4.3 MMOL/L — SIGNIFICANT CHANGE UP (ref 3.5–5.3)
PROT ?TM UR-MCNC: 20 MG/DL — SIGNIFICANT CHANGE UP
PROT ?TM UR-MCNC: 20 MG/DL — SIGNIFICANT CHANGE UP
PROT SERPL-MCNC: 6.8 G/DL — SIGNIFICANT CHANGE UP (ref 6–8.3)
PROT UR-MCNC: ABNORMAL
PROT/CREAT UR-RTO: 0.2 RATIO — SIGNIFICANT CHANGE UP (ref 0–0.2)
PROTHROM AB SERPL-ACNC: 10.8 SEC — SIGNIFICANT CHANGE UP (ref 10.5–13.4)
RBC # BLD: 4.05 M/UL — SIGNIFICANT CHANGE UP (ref 3.8–5.2)
RBC # FLD: 13.2 % — SIGNIFICANT CHANGE UP (ref 10.3–14.5)
RBC CASTS # UR COMP ASSIST: 2 /HPF — SIGNIFICANT CHANGE UP (ref 0–4)
RH IG SCN BLD-IMP: POSITIVE — SIGNIFICANT CHANGE UP
RH IG SCN BLD-IMP: POSITIVE — SIGNIFICANT CHANGE UP
SODIUM SERPL-SCNC: 136 MMOL/L — SIGNIFICANT CHANGE UP (ref 135–145)
SP GR SPEC: 1.02 — SIGNIFICANT CHANGE UP (ref 1.01–1.05)
URATE SERPL-MCNC: 6.7 MG/DL — SIGNIFICANT CHANGE UP (ref 2.5–7)
UROBILINOGEN FLD QL: SIGNIFICANT CHANGE UP
WBC # BLD: 10.11 K/UL — SIGNIFICANT CHANGE UP (ref 3.8–10.5)
WBC # FLD AUTO: 10.11 K/UL — SIGNIFICANT CHANGE UP (ref 3.8–10.5)
WBC UR QL: 11 /HPF — HIGH (ref 0–5)

## 2023-06-13 PROCEDURE — L9996: CPT

## 2023-06-13 PROCEDURE — 93010 ELECTROCARDIOGRAM REPORT: CPT

## 2023-06-13 RX ORDER — ACETAMINOPHEN 500 MG
1000 TABLET ORAL ONCE
Refills: 0 | Status: COMPLETED | OUTPATIENT
Start: 2023-06-13 | End: 2023-06-13

## 2023-06-13 RX ORDER — METOCLOPRAMIDE HCL 10 MG
10 TABLET ORAL ONCE
Refills: 0 | Status: COMPLETED | OUTPATIENT
Start: 2023-06-13 | End: 2023-06-13

## 2023-06-13 RX ORDER — DIPHENHYDRAMINE HCL 50 MG
25 CAPSULE ORAL ONCE
Refills: 0 | Status: COMPLETED | OUTPATIENT
Start: 2023-06-13 | End: 2023-06-13

## 2023-06-13 RX ORDER — METFORMIN HYDROCHLORIDE 850 MG/1
1 TABLET ORAL
Qty: 0 | Refills: 0 | DISCHARGE

## 2023-06-13 RX ADMIN — Medication 25 MILLIGRAM(S): at 22:54

## 2023-06-13 RX ADMIN — Medication 1000 MILLIGRAM(S): at 22:42

## 2023-06-13 RX ADMIN — Medication 10 MILLIGRAM(S): at 22:54

## 2023-06-13 RX ADMIN — Medication 1000 MILLIGRAM(S): at 21:42

## 2023-06-13 NOTE — OB RN TRIAGE NOTE - CHIEF COMPLAINT QUOTE
Elevated BP today 148//103 with headache 8/10, tightness on the chest.  Pt s/p Tylenol 500 mgs yesterday at 1900

## 2023-06-13 NOTE — OB PROVIDER TRIAGE NOTE - NSOBPROVIDERNOTE_OBGYN_ALL_OB_FT
TAS  Hellp labs  BP monitoring  NPO   Tylenol 1 gm PO x1 for HA 8/10 pain  EKG pt reports chest pressure  bile acids sent yesterday from [place] for urticaria  d/w Dr Wilson for plan of care- admit pt, awaiting lab results, monitor BPs for severe range BPs give Tyelnol for 8/10 headache, possible Magnesium, keep NPO for presumed repeat c/s @ 2am (RUSSELL 6pm) 33 yo  @ 35.1 wks sent in by [facility] for evaluation and possible delivery via c/s for elevated blood pressures, ha, swelling, and urticaria. denies vb lof or contractions, +GFM. AP course ghtn, GDMA2 on insulin. seen at  yesterday for same complaints and sent home labs ok, f/u with MFM today and told to come to [facility]. pt reports ha x 2 days relieved by Tylenol yesterday, now has HA 8/10 did not take Tylenol today, new swelling to the face x 2 days, urticaria x 2 days(bile acids sent at [place] in process in HIE) denies fever chills n/v epigastric pain cp sob or cough. scheduled for repeat c/s 7/10/23, breech. RUSSELL 6pm    GBS: done as per pt and negative  meds: ASA, PNV insulin 18 units qHS, 10 units pre dinner, levothyroxine 225mcg daily  all: denies  PMH: hypothyroidism  PSH: c/s x 1 rachelle cystectomy   gyn hx: denies  ob hx: 2015 IOL for PEC @ 38 wks failure to dilate primary c/s readmit for inc dehis and antibx  MAB x 1 no sx    TAS  Hellp labs  BP monitoring  NPO   Tylenol 1 gm PO x1 for HA 8/10 pain  EKG pt reports chest pressure on arrival which has resolved  bile acids sent yesterday from [place] for urticaria  d/w Dr Wilson for plan of care- admit pt, awaiting lab results, monitor BPs for severe range BPs give Tyelnol for 8/10 headache, possible Magnesium, keep NPO for presumed repeat c/s @ 2am (RUSSELL 6pm) 33 yo  @ 35.1 wks sent in by [facility] for evaluation and possible delivery via c/s for elevated blood pressures, ha, swelling, and urticaria. denies vb lof or contractions, +GFM. AP course ghtn, GDMA2 on insulin. seen at  yesterday for same complaints and sent home labs ok, f/u with MFM today and told to come to [facility]. pt reports ha x 2 days relieved by Tylenol yesterday, now has HA 8/10 did not take Tylenol today, new swelling to the face x 2 days, urticaria x 2 days(bile acids sent at [place] in process in HIE) denies fever chills n/v epigastric pain cp sob or cough. scheduled for repeat c/s 7/10/23, breech. RUSSELL 6pm    GBS: done as per pt and negative  meds: ASA, PNV insulin 18 units qHS, 10 units pre dinner, levothyroxine 225mcg daily  all: denies  PMH: hypothyroidism  PSH: c/s x 1 rachelle cystectomy   gyn hx: denies  ob hx: 2015 IOL for PEC @ 38 wks failure to dilate primary c/s readmit for inc dehis and antibx  MAB x 1 no sx    TAS  Hellp labs  BP monitoring  NPO   Tylenol 1 gm PO x1 for HA 8/10 pain  EKG pt reports chest pressure on arrival which has resolved  bile acids sent yesterday from [place] for urticaria    d/w Dr Metzger admit for PEC @ 35.1 wks  d/w Dr Wilson for plan of care- admit pt,   awaiting lab results,   monitor BPs for severe range BPs   give Tyelnol for 8/10 headache, possible Magnesium,   keep NPO for presumed repeat c/s @ 2am (RUSSELL 6pm)  d/w Dr Wilson no change in headache 8/10 after tylenol,   give Reglan 10 mg ivp x 1 and Benadryl 25mg IV x 1 and reevaluate headache in one hour  Prenatals sent  POCT Fingersticks q 4 hours for GDMA2 31 yo obese  @ 35.1 wks sent in by [facility] for evaluation and possible delivery via c/s for elevated blood pressures, ha, swelling, and urticaria. denies vb lof or contractions, +GFM. AP course ghtn, GDMA2 on insulin. seen at  yesterday for same complaints and sent home labs ok, f/u with MFM today and told to come to [facility]. pt reports ha x 2 days relieved by Tylenol yesterday, now has HA 8/10 did not take Tylenol today, new swelling to the face x 2 days, urticaria x 2 days(bile acids sent at [place] in process in HIE) denies fever chills n/v epigastric pain cp sob or cough. scheduled for repeat c/s 7/10/23, breech. RUSSELL 6pm    GBS: done as per pt and negative  meds: ASA, PNV insulin 18 units qHS, 10 units pre dinner, levothyroxine 225mcg daily  all: denies  PMH: hypothyroidism  PSH: c/s x 1 rachelle cystectomy   gyn hx: denies  ob hx: 2015 IOL for PEC @ 38 wks failure to dilate primary c/s readmit for inc dehis and antibx  MAB x 1 no sx    TAS  Hellp labs  BP monitoring  NPO   Tylenol 1 gm PO x1 for HA 8/10 pain  EKG pt reports chest pressure on arrival which has resolved  bile acids sent yesterday from [place] for urticaria    d/w Dr Metzger admit for PEC @ 35.1 wks  d/w Dr Wilson for plan of care- admit pt,   awaiting lab results,   monitor BPs for severe range BPs   give Tyelnol for 8/10 headache, possible Magnesium,   keep NPO for presumed repeat c/s @ 2am (RUSSELL 6pm)  d/w Dr Wilson no change in headache 8/10 after tylenol,   give Reglan 10 mg ivp x 1 and Benadryl 25mg IV x 1 and reevaluate headache in one hour  Prenatals sent  POCT Fingersticks q 4 hours for GDMA2

## 2023-06-13 NOTE — OB PROVIDER TRIAGE NOTE - HISTORY OF PRESENT ILLNESS
31 yo  @ 35.1 wks sent in by [facility] for evaluation and possible delivery via c/s for elevated blood pressures, ha, swelling, and urticaria. denies vb lof or contractions, +GFM. AP course ghtn, GDMA2 on insulin. seen at  yesterday for same complaints and sent home labs ok, f/u with MFM today and told to come to [facility]. pt reports ha x 2 days relieved by Tylenol yesterday, now has HA 8/10 did not take Tylenol today, new swelling to the face x 2 days, urticaria x 2 days(bile acids sent at [place] in process in HIE) denies fever chills n/v epigastric pain cp sob or cough. scheduled for repeat c/s 7/10/23, breech. RUSSELL 6pm  GBS: done as per pt and negative  meds: ASA, PNV insulin 18 units qHS, 10 units pre dinner, levothyroxine 225mcg daily  all: denies  PMH: hypothyroidism  PSH: c/s x 1 rachelle cystectomy   gyn hx: denies  ob hx: 2015 IOL for PEC @ 38 wks failure to dilate primary c/s readmit for inc dehis and antibx  MAB x 1 no sx 31 yo obese  @ 35.1 wks sent in by [facility] for evaluation and possible delivery via c/s for elevated blood pressures, ha, swelling, and urticaria. denies vb lof or contractions, +GFM. AP course ghtn, GDMA2 on insulin. seen at  yesterday for same complaints and sent home labs ok, f/u with MF today and told to come to [facility]. pt reports ha x 2 days relieved by Tylenol yesterday, now has HA 8/10 did not take Tylenol today, new swelling to the face x 2 days, urticaria x 2 days(bile acids sent at [place] in process in HIE) denies fever chills n/v epigastric pain cp sob or cough. scheduled for repeat c/s 7/10/23, breech. RUSSELL 6pm  GBS: done as per pt and negative  meds: ASA, PNV insulin 18 units qHS, 10 units pre dinner, levothyroxine 225mcg daily  all: denies  PMH: hypothyroidism  PSH: c/s x 1 rachelle cystectomy   gyn hx: denies  ob hx: 2015 IOL for PEC @ 38 wks failure to dilate primary c/s readmit for inc dehis and antibx  MAB x 1 no sx

## 2023-06-13 NOTE — OB PROVIDER TRIAGE NOTE - NSHPPHYSICALEXAM_GEN_ALL_CORE
abd soft gravid NT  CV RRR  LS clear bilaterally  TAS:  FHT: moderate variability + accelerations negative decelerations  toco: none no pain  Vital Signs Last 24 Hrs  T(C): 36.7 (13 Jun 2023 20:26), Max: 36.7 (13 Jun 2023 20:26)  T(F): 98.1 (13 Jun 2023 20:26), Max: 98.1 (13 Jun 2023 20:26)  HR: 73 (13 Jun 2023 21:43) (71 - 83)  BP: 149/80 (13 Jun 2023 21:43) (147/82 - 159/103)  BP(mean): --  RR: 16 (13 Jun 2023 20:26) (16 - 16)  SpO2: 94% (13 Jun 2023 21:35) (92% - 100%) abd soft gravid NT  CV RRR  LS clear bilaterally  TAS: images saved on sono  breech  anterior placenta  BPP 8/8  NOE 7.4  FHT: moderate variability + accelerations negative decelerations  toco: none no pain  Vital Signs Last 24 Hrs  T(C): 36.7 (13 Jun 2023 20:26), Max: 36.7 (13 Jun 2023 20:26)  T(F): 98.1 (13 Jun 2023 20:26), Max: 98.1 (13 Jun 2023 20:26)  HR: 73 (13 Jun 2023 21:43) (71 - 83)  BP: 149/80 (13 Jun 2023 21:43) (147/82 - 159/103)  BP(mean): --  RR: 16 (13 Jun 2023 20:26) (16 - 16)  SpO2: 94% (13 Jun 2023 21:35) (92% - 100%) clear speech , no vision changes NAD, equal strength bilaterally upper and lower extremities   abd soft gravid NT  CV RRR  LS clear bilaterally  LE: nonpitting edema, improves with elevation, no pain or tenderness, negative Homans sign bilaterally  TAS: images saved on sono  breech  anterior placenta  BPP 8/8  NOE 7.4  FHT: moderate variability + accelerations negative decelerations  toco: none no pain  Vital Signs Last 24 Hrs  T(C): 36.7 (13 Jun 2023 20:26), Max: 36.7 (13 Jun 2023 20:26)  T(F): 98.1 (13 Jun 2023 20:26), Max: 98.1 (13 Jun 2023 20:26)  HR: 73 (13 Jun 2023 21:43) (71 - 83)  BP: 149/80 (13 Jun 2023 21:43) (147/82 - 159/103)  BP(mean): --  RR: 16 (13 Jun 2023 20:26) (16 - 16)  SpO2: 94% (13 Jun 2023 21:35) (92% - 100%)

## 2023-06-13 NOTE — OB PROVIDER H&P - PROBLEM SELECTOR PLAN 1
d/w Dr Metzger admit for PEC @ 35.1 wks monitor BPs, NPO for repeat c/s  d/w Dr Wilson for plan of care- admit pt,   awaiting HELLP lab results,   monitor BPs for severe range BPs   give Tylenol for 8/10 headache, possible Magnesium,   keep NPO for presumed repeat c/s @ 2am (RUSSELL 6pm)  d/w Dr Wilson no change in headache 8/10 after tylenol,   give Reglan 10 mg ivp x 1 and Benadryl 25mg IV x 1 and reevaluate headache in one hour, no magnesium at this time  Prenatals sent  POCT Fingersticks q 4 hours for GDMA2   PRBC on hold 2/2 previous c/s PPH risk d/w Dr Metzger admit for PEC @ 35.1 wks monitor BPs, NPO for repeat c/s  d/w Dr Wilson for plan of care- admit pt,   awaiting HELLP lab results,   monitor BPs for severe range BPs   give Tylenol for 8/10 headache, possible Magnesium,   keep NPO for presumed repeat c/s @ 2am (RUSSELL 6pm)  d/w Dr Wilson no change in headache 8/10 after tylenol,   give Reglan 10 mg ivp x 1 and Benadryl 25mg IV x 1 and reevaluate headache in one hour, no magnesium at this time  Prenatals sent  POCT Fingersticks q 4 hours for GDMA2   PRBC on hold 2/2 previous c/s, BMI>40, PPH risk

## 2023-06-13 NOTE — OB RN TRIAGE NOTE - NS PRO ABUSE SCREEN SUSPICION NEGLECT YN
ill brochities cough chest pain and pain all over her body      Onset: This week     Location / description: Cough, Bronchitis is back 3rd time started in July, and in August. 3 rounds of antibiotics already. Last dose of ABT on 8/22/22    Precipitating Factors:     Pain Scale (1-10), 10 highest: 5/10    Associated Symptoms: Sinus pain, headache, yellow phlegm, deep cough, chest pain from coughing and back pain started on 9/1/22 constant 5/10 - under the shoulder blade, hands and feet pain, feverish, sweating at time- feels like breaking a fever    What improves / worsens symptoms: Sinus medication -helping a little    Symptom specific medications: None    LMP : Patient's last menstrual period was 06/01/2022 (exact date).     Are you pregnant or breast feeding: N/A    Recent visits (last 3-4 weeks) for same   reason or recent surgery: 8/8/22 went to ER for cough and coughing up blood    PLAN:   Directed to Emergency Department patient wants to go to [facility]    Patient/Caller agrees to follow recommendations.    Reason for Disposition  • SEVERE coughing spells (e.g., whooping sound after coughing, vomiting after coughing)  • Patient sounds very sick or weak to the triager    Protocols used: COUGH - ACUTE NON-PRODUCTIVE-A-AH, BACK PAIN-A-AH      
[de-identified] : Patient is here for LBP. Has been going on for >10 years, pain radiates down b/l but worse on the right side. Known hx of lumbar radic. Was eval/treated in the past, last MIKE around 3 years ago, MRI around that time as well, responded well with physical therapy. Has since been at home/limited moving around 2/2 pandemic. Symptoms started worsen few months ago, takes alevee and tylenol prn for the pain. Seen pain management at [facility] years back. Denies any current weakness, uses jackson when walking.\par \par The patient's past medical history, past surgical history, medications and allergies were reviewed by me today and documented accordingly. In addition, the patient's family and social history, which were noncontributory to this visit, were reviewed also. Intake form was reviewed. The patient has no family history of arthritis. 
no

## 2023-06-13 NOTE — OB RN PATIENT PROFILE - CURRENT PREGNANCY COMPLICATIONS, OB PROFILE
GDMA2, Breech/Gestational Diabetes/Hypertensive Disorder GDMA2, Breech/Gestational Diabetes/Hypertensive Disorder/Maternal Unknown GBS

## 2023-06-13 NOTE — OB PROVIDER H&P - ASSESSMENT
33 yo  @ 35.1 wks sent in by [facility] for evaluation and possible delivery via c/s for elevated blood pressures, ha, swelling, and urticaria. denies vb lof or contractions, +GFM. AP course ghtn, GDMA2 on insulin. seen at  yesterday for same complaints and sent home labs ok, f/u with MFM today and told to come to [facility]. pt reports ha x 2 days relieved by Tylenol yesterday, now has HA 8/10 did not take Tylenol today, new swelling to the face x 2 days, urticaria x 2 days(bile acids sent at [place] in process in HIE) denies fever chills n/v epigastric pain cp sob or cough. scheduled for repeat c/s 7/10/23, breech. RUSSELL 6pm    GBS: done as per pt and negative  meds: ASA, PNV insulin 18 units qHS, 10 units pre dinner, levothyroxine 225mcg daily  all: denies  PMH: hypothyroidism  PSH: c/s x 1 rachelle cystectomy   gyn hx: denies  ob hx: 2015 IOL for PEC @ 38 wks failure to dilate primary c/s readmit for inc dehis and antibx  MAB x 1 no sx    TAS  Hellp labs  BP monitoring  NPO   Tylenol 1 gm PO x1 for HA 8/10 pain  EKG pt reports chest pressure on arrival which has resolved  bile acids sent yesterday from [place] for urticaria    d/w Dr Metzger admit for PEC @ 35.1 wks monitor BPs, NPO for repeat c/s  d/w Dr Wilson for plan of care- admit pt,   awaiting HELLP lab results,   monitor BPs for severe range BPs   give Tyelnol for 8/10 headache, possible Magnesium,   keep NPO for presumed repeat c/s @ 2am (RUSSELL 6pm)  d/w Dr Wilson no change in headache 8/10 after tylenol,   give Reglan 10 mg ivp x 1 and Benadryl 25mg IV x 1 and reevaluate headache in one hour, no magnesium at this time  Prenatals sent  POCT Fingersticks q 4 hours for GDMA2   PRBC on hold 2/2 previous c/s PPH risk 31 yo obese  @ 35.1 wks sent in by [facility] for evaluation and possible delivery via c/s for elevated blood pressures, ha, swelling, and urticaria. denies vb lof or contractions, +GFM. AP course ghtn, GDMA2 on insulin. seen at  yesterday for same complaints and sent home labs ok, f/u with MF today and told to come to [facility]. pt reports ha x 2 days relieved by Tylenol yesterday, now has HA 8/10 did not take Tylenol today, new swelling to the face x 2 days, urticaria x 2 days(bile acids sent at [place] in process in HIE) denies fever chills n/v epigastric pain cp sob or cough. scheduled for repeat c/s 7/10/23, breech. RUSSELL 6pm    GBS: done as per pt and negative  meds: ASA, PNV insulin 18 units qHS, 10 units pre dinner, levothyroxine 225mcg daily  all: denies  PMH: hypothyroidism  PSH: c/s x 1 rachelle cystectomy   gyn hx: denies  ob hx: 2015 IOL for PEC @ 38 wks failure to dilate primary c/s readmit for inc dehis and antibx  MAB x 1 no sx    TAS  Hellp labs  BP monitoring  NPO   Tylenol 1 gm PO x1 for HA 8/10 pain  EKG pt reports chest pressure on arrival which has resolved  bile acids sent yesterday from [place] for urticaria    d/w Dr Metzger admit for PEC @ 35.1 wks monitor BPs, NPO for repeat c/s  d/w Dr Wilson for plan of care- admit pt,   awaiting HELLP lab results,   monitor BPs for severe range BPs   give Tyelnol for 8/10 headache, possible Magnesium,   keep NPO for presumed repeat c/s @ 2am (RUSSELL 6pm)  d/w Dr Wilson no change in headache 8/10 after tylenol,   give Reglan 10 mg ivp x 1 and Benadryl 25mg IV x 1 and reevaluate headache in one hour, no magnesium at this time  Prenatals sent  POCT Fingersticks q 4 hours for GDMA2   PRBC on hold 2/2 previous c/s PPH risk

## 2023-06-13 NOTE — ED ADULT TRIAGE NOTE - CHIEF COMPLAINT QUOTE
states"  I am 35 weeks pregnant and I have been having elevated blood pressure since one and half week with head ache and my doctor asked me to come to get checked as I may need to deliver my baby here," also states I am having breech presentation and CONG . . states"  I am 35 weeks pregnant and I have been having elevated blood pressure since one and half week with head ache and my doctor asked me to come to get checked in as I may need to deliver my baby here and my baby weigh less than 5 pounds " also states I am having breech presentation and CONG . . L & D called.

## 2023-06-13 NOTE — OB PROVIDER H&P - ABORTIONS, OB PROFILE
Detail Level: Zone Topical Steroids Counseling: I discussed with the patient that prolonged use of topical steroids can result in the increased appearance of superficial blood vessels (telangiectasias), lightening (hypopigmentation) and thinning of the skin (atrophy).   The patient verbalized understanding of the proper use and possible adverse effects of topical steroids.  All of the patient's questions and concerns were addressed. 1

## 2023-06-13 NOTE — OB PROVIDER TRIAGE NOTE - NSHPLABSRESULTS_GEN_ALL_CORE
11.0   10.11 )-----------( 217      ( 2023 20:44 )             32.4   06-13    136  |  101  |  8   ----------------------------<  81  4.3   |  23  |  0.70    Ca    9.1      2023 20:44    TPro  6.8  /  Alb  3.4  /  TBili  <0.2  /  DBili  x   /  AST  18  /  ALT  15  /  AlkPhos  132<H>  06-13  PT/INR - ( 2023 20:44 )   PT: 10.8 sec;   INR: 0.93 ratio         PTT - ( 2023 20:44 )  PTT:32.7 sec    Urinalysis Basic - ( 2023 20:44 )    Color: Light Yellow / Appearance: Clear / S.020 / pH: x  Gluc: x / Ketone: Negative  / Bili: Negative / Urobili: <2 mg/dL   Blood: x / Protein: Trace / Nitrite: Negative   Leuk Esterase: Large / RBC: 2 /HPF / WBC 11 /HPF   Sq Epi: x / Non Sq Epi: x / Bacteria: Negative    PCR 0.2

## 2023-06-13 NOTE — OB PROVIDER H&P - NSHPPHYSICALEXAM_GEN_ALL_CORE
clear speech , no vision changes NAD, equal strength bilaterally upper and lower extremities   abd soft gravid NT  CV RRR  LS clear bilaterally  LE: nonpitting edema, improves with elevation, no pain or tenderness, negative Homans sign bilaterally  TAS: images saved on sono  breech  anterior placenta  BPP 8/8  NOE 7.4  FHT: moderate variability + accelerations negative decelerations  toco: none no pain  Vital Signs Last 24 Hrs  T(C): 36.7 (13 Jun 2023 20:26), Max: 36.7 (13 Jun 2023 20:26)  T(F): 98.1 (13 Jun 2023 20:26), Max: 98.1 (13 Jun 2023 20:26)  HR: 73 (13 Jun 2023 21:43) (71 - 83)  BP: 149/80 (13 Jun 2023 21:43) (147/82 - 159/103)  BP(mean): --  RR: 16 (13 Jun 2023 20:26) (16 - 16)  SpO2: 94% (13 Jun 2023 21:35) (92% - 100%) clear speech , no vision changes NAD, equal strength bilaterally upper and lower extremities   abd obese soft gravid NT  CV RRR  LS clear bilaterally  LE: nonpitting edema, improves with elevation, no pain or tenderness, negative Homans sign bilaterally  TAS: images saved on sono  breech  anterior placenta  BPP 8/8  NOE 7.4  FHT: moderate variability + accelerations negative decelerations  toco: none no pain  Vital Signs Last 24 Hrs  T(C): 36.7 (13 Jun 2023 20:26), Max: 36.7 (13 Jun 2023 20:26)  T(F): 98.1 (13 Jun 2023 20:26), Max: 98.1 (13 Jun 2023 20:26)  HR: 73 (13 Jun 2023 21:43) (71 - 83)  BP: 149/80 (13 Jun 2023 21:43) (147/82 - 159/103)  BP(mean): --  RR: 16 (13 Jun 2023 20:26) (16 - 16)  SpO2: 94% (13 Jun 2023 21:35) (92% - 100%)

## 2023-06-13 NOTE — OB RN PATIENT PROFILE - FUNCTIONAL ASSESSMENT - BASIC MOBILITY ASSESSMENT TYPE
CC:  Follow up    HPI:  This patient is a 87 year old female here for follow up. Pt VERY hard of hearing. No haring aids  -- I wrote down a lot of questions on paper for pt to read and answer  -- saw Dr Wooten this am. Says 6 month follow up    Cardiology: denies chest pain, dizzy/lightheaded, SOB, leg edema, orthopnea, palpitations, syncope, headaches    Pulmonary: denies SOB, MONTANO, cough, wheeze, chest congestion, chest pain, orthopnea, hemoptysis, night sweats, fevers    Gastroenterology: denies abdominal pain, N/V, D/C, heartburn, dysphagia    Genito-Urinary: denies dysuria, urinary frequency, urinary urgency, hematuria, incontinence, nocturia    Rheumatology: denies joint pain, joint stiffness, joint swelling    Neurology: denies LOC, D/L, syncope, near syncope, HA, memory loss, paresthesias, head injury    Psychology: denies mood change, panic attacks, sleep disturbance, energy change, weight change, appetite change, concentration, suicidal ideations    ALLERGIES:  No Known Allergies    Outpatient Medications Prior to Visit   Medication Sig Dispense Refill   • Calcium-Vitamin D 600-200 MG-UNIT Tab 1 tab(s) orally twice a day     • amLODIPine (NORVASC) 5 MG tablet Take 1 tablet by mouth daily. 90 tablet 3   • metoPROLOL succinate (TOPROL-XL) 100 MG 24 hr tablet Take 1 tablet by mouth daily. 90 tablet 3   • pravastatin (PRAVACHOL) 40 MG tablet Take 1 tablet by mouth daily. 90 tablet 3     No facility-administered medications prior to visit.       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   HENT: Negative for congestion, postnasal drip and sneezing.    Respiratory: Negative for cough, chest tightness, shortness of breath and wheezing.    Cardiovascular: Negative for chest pain and palpitations.   Genitourinary: Negative for dysuria and urgency.   Neurological: Negative for dizziness and light-headedness.       EXAMINATION:  Visit Vitals  /60   Pulse 78   Temp 98 °F (36.7 °C) (Temporal)   Resp 16   Ht 4' 10\" (1.473 m)   Wt 35.7 kg (78 lb 11.2 oz)   SpO2 98%   BMI 16.45 kg/m²     General Appearance: alert, NAD  Skin: no rash or skin lesions  HEENT: EOMI, conjunctivae pink, TM's clear and flat B/L, EAC patent, oropharynx unremarkable   Neck, Thyroid: supple, no thyromegaly, no lymphadenopathy  Heart: RRR, no murmurs, normal S1S2, no S3/S4  Lungs: CTA B/L.   Abdominal: soft. NT, NABS, no G/R/R, no tympany, no HSM  Extremities: no E/C/C, pulses 2+ B/L  Peripheral Pulses: 2+ B/L   Neurologic: CN II-XII intact, normal sensation and strength, DTRs 2/4 and symmetric B/L, gait normal     ASSESSMENT/PLAN:    1. Essential (primary) hypertension    2. Hypercholesterolemia    3. Hypertriglyceridemia    4. Need for influenza vaccination    5. Bilateral hearing loss, unspecified hearing loss  type        Current Outpatient Medications   Medication Sig Dispense Refill   • amLODIPine (NORVASC) 5 MG tablet Take 1 tablet by mouth daily. 90 tablet 3   • pravastatin (PRAVACHOL) 40 MG tablet Take 1 tablet by mouth daily. 90 tablet 3   • metoPROLOL succinate (TOPROL-XL) 100 MG 24 hr tablet Take 1 tablet by mouth daily. 90 tablet 3   • Calcium-Vitamin D 600-200 MG-UNIT Tab 1 tab(s) orally twice a day       No current facility-administered medications for this visit.         Labs d/w pt. Copies given and/or pt shown access to portal for their own viewing    Vaccines needed:         HTN - cont amlodipine, metoprolol. Check cmp today     HL - cont pravastatin. Check lipid panel today     CLL - keep f/u Dr Quintin HOOKER - told pt she needs hearing aid. Pt not sure she will do this.     EKG 8-20     Re breast cancer screening, mammogram declined. Pt aware the purpose of a mammogram is to check for asymptomatic breast cancer.     Re osteoporosis screening, bone density declined. Pt aware the purpose of a bone density test is to check for asymptomatic osteoporosis that can result in hip/vertebral fractures.     Re colorectal cancer screening, colonoscopy declined. Pt aware the purpose of a colonoscopy is to check for asymptomatic colorectal cancer.       Discussed treatment options and plan with patient. All questions answered.      30 minutes spent in total pt care. > 50% spent couseling/coordinating care.        Follow up: 6 mo, labs prior (cmp, lipid)      Aracely Hwang DO   Admission

## 2023-06-13 NOTE — OB PROVIDER H&P - HISTORY OF PRESENT ILLNESS
31 yo  @ 35.1 wks sent in by [facility] for evaluation and possible delivery via c/s for elevated blood pressures, ha, swelling, and urticaria. denies vb lof or contractions, +GFM. AP course ghtn, GDMA2 on insulin. seen at  yesterday for same complaints and sent home labs ok, f/u with MFM today and told to come to [facility]. pt reports ha x 2 days relieved by Tylenol yesterday, now has HA 8/10 did not take Tylenol today, new swelling to the face x 2 days, urticaria x 2 days(bile acids sent at [place] in process in HIE) denies fever chills n/v epigastric pain cp sob or cough. scheduled for repeat c/s 7/10/23, breech. RUSSELL 6pm  GBS: done as per pt and negative  meds: ASA, PNV insulin 18 units qHS, 10 units pre dinner, levothyroxine 225mcg daily  all: denies  PMH: hypothyroidism  PSH: c/s x 1 rachelle cystectomy   gyn hx: denies  ob hx: 2015 IOL for PEC @ 38 wks failure to dilate primary c/s readmit for inc dehis and antibx  MAB x 1 no sx 33 yo obese  @ 35.1 wks sent in by [facility] for evaluation and possible delivery via c/s for elevated blood pressures, ha, swelling, and urticaria. denies vb lof or contractions, +GFM. AP course ghtn, GDMA2 on insulin. seen at  yesterday for same complaints and sent home labs ok, f/u with MF today and told to come to [facility]. pt reports ha x 2 days relieved by Tylenol yesterday, now has HA 8/10 did not take Tylenol today, new swelling to the face x 2 days, urticaria x 2 days(bile acids sent at [place] in process in HIE) denies fever chills n/v epigastric pain cp sob or cough. scheduled for repeat c/s 7/10/23, breech. RUSSELL 6pm  GBS: done as per pt and negative  meds: ASA, PNV insulin 18 units qHS, 10 units pre dinner, levothyroxine 225mcg daily  all: denies  PMH: hypothyroidism  PSH: c/s x 1 rachelle cystectomy   gyn hx: denies  ob hx: 2015 IOL for PEC @ 38 wks failure to dilate primary c/s readmit for inc dehis and antibx  MAB x 1 no sx

## 2023-06-14 ENCOUNTER — ASOB RESULT (OUTPATIENT)
Age: 33
End: 2023-06-14

## 2023-06-14 ENCOUNTER — TRANSCRIPTION ENCOUNTER (OUTPATIENT)
Age: 33
End: 2023-06-14

## 2023-06-14 ENCOUNTER — APPOINTMENT (OUTPATIENT)
Dept: ANTEPARTUM | Facility: CLINIC | Age: 33
End: 2023-06-14
Payer: MEDICAID

## 2023-06-14 LAB
ALBUMIN SERPL ELPH-MCNC: 3 G/DL — LOW (ref 3.3–5)
ALBUMIN SERPL ELPH-MCNC: 3 G/DL — LOW (ref 3.3–5)
ALP SERPL-CCNC: 127 U/L — HIGH (ref 40–120)
ALP SERPL-CCNC: 128 U/L — HIGH (ref 40–120)
ALT FLD-CCNC: 10 U/L — SIGNIFICANT CHANGE UP (ref 4–33)
ALT FLD-CCNC: 11 U/L — SIGNIFICANT CHANGE UP (ref 4–33)
ANION GAP SERPL CALC-SCNC: 11 MMOL/L — SIGNIFICANT CHANGE UP (ref 7–14)
ANION GAP SERPL CALC-SCNC: 12 MMOL/L — SIGNIFICANT CHANGE UP (ref 7–14)
APTT BLD: 29.9 SEC — SIGNIFICANT CHANGE UP (ref 27–36.3)
APTT BLD: 30.1 SEC — SIGNIFICANT CHANGE UP (ref 27–36.3)
AST SERPL-CCNC: 15 U/L — SIGNIFICANT CHANGE UP (ref 4–32)
AST SERPL-CCNC: 17 U/L — SIGNIFICANT CHANGE UP (ref 4–32)
BASOPHILS # BLD AUTO: 0.04 K/UL — SIGNIFICANT CHANGE UP (ref 0–0.2)
BASOPHILS # BLD AUTO: 0.04 K/UL — SIGNIFICANT CHANGE UP (ref 0–0.2)
BASOPHILS NFR BLD AUTO: 0.4 % — SIGNIFICANT CHANGE UP (ref 0–2)
BASOPHILS NFR BLD AUTO: 0.4 % — SIGNIFICANT CHANGE UP (ref 0–2)
BILIRUB SERPL-MCNC: <0.2 MG/DL — SIGNIFICANT CHANGE UP (ref 0.2–1.2)
BILIRUB SERPL-MCNC: <0.2 MG/DL — SIGNIFICANT CHANGE UP (ref 0.2–1.2)
BUN SERPL-MCNC: 11 MG/DL — SIGNIFICANT CHANGE UP (ref 7–23)
BUN SERPL-MCNC: 11 MG/DL — SIGNIFICANT CHANGE UP (ref 7–23)
CALCIUM SERPL-MCNC: 8.4 MG/DL — SIGNIFICANT CHANGE UP (ref 8.4–10.5)
CALCIUM SERPL-MCNC: 8.5 MG/DL — SIGNIFICANT CHANGE UP (ref 8.4–10.5)
CHLORIDE SERPL-SCNC: 102 MMOL/L — SIGNIFICANT CHANGE UP (ref 98–107)
CHLORIDE SERPL-SCNC: 104 MMOL/L — SIGNIFICANT CHANGE UP (ref 98–107)
CO2 SERPL-SCNC: 20 MMOL/L — LOW (ref 22–31)
CO2 SERPL-SCNC: 21 MMOL/L — LOW (ref 22–31)
COVID-19 SPIKE DOMAIN AB INTERP: POSITIVE
COVID-19 SPIKE DOMAIN ANTIBODY RESULT: >250 U/ML — HIGH
CREAT ?TM UR-MCNC: 86 MG/DL — SIGNIFICANT CHANGE UP
CREAT SERPL-MCNC: 0.61 MG/DL — SIGNIFICANT CHANGE UP (ref 0.5–1.3)
CREAT SERPL-MCNC: 0.7 MG/DL — SIGNIFICANT CHANGE UP (ref 0.5–1.3)
EGFR: 118 ML/MIN/1.73M2 — SIGNIFICANT CHANGE UP
EGFR: 122 ML/MIN/1.73M2 — SIGNIFICANT CHANGE UP
EOSINOPHIL # BLD AUTO: 0.15 K/UL — SIGNIFICANT CHANGE UP (ref 0–0.5)
EOSINOPHIL # BLD AUTO: 0.16 K/UL — SIGNIFICANT CHANGE UP (ref 0–0.5)
EOSINOPHIL NFR BLD AUTO: 1.6 % — SIGNIFICANT CHANGE UP (ref 0–6)
EOSINOPHIL NFR BLD AUTO: 1.7 % — SIGNIFICANT CHANGE UP (ref 0–6)
FIBRINOGEN PPP-MCNC: 491 MG/DL — HIGH (ref 200–465)
FIBRINOGEN PPP-MCNC: 585 MG/DL — HIGH (ref 200–465)
GLUCOSE SERPL-MCNC: 69 MG/DL — LOW (ref 70–99)
GLUCOSE SERPL-MCNC: 93 MG/DL — SIGNIFICANT CHANGE UP (ref 70–99)
HBV SURFACE AG SERPL QL IA: SIGNIFICANT CHANGE UP
HCT VFR BLD CALC: 33 % — LOW (ref 34.5–45)
HCT VFR BLD CALC: 33.3 % — LOW (ref 34.5–45)
HGB BLD-MCNC: 10.6 G/DL — LOW (ref 11.5–15.5)
HGB BLD-MCNC: 10.7 G/DL — LOW (ref 11.5–15.5)
IANC: 5.93 K/UL — SIGNIFICANT CHANGE UP (ref 1.8–7.4)
IANC: 6.52 K/UL — SIGNIFICANT CHANGE UP (ref 1.8–7.4)
IMM GRANULOCYTES NFR BLD AUTO: 0.6 % — SIGNIFICANT CHANGE UP (ref 0–0.9)
IMM GRANULOCYTES NFR BLD AUTO: 0.9 % — SIGNIFICANT CHANGE UP (ref 0–0.9)
INR BLD: 0.96 RATIO — SIGNIFICANT CHANGE UP (ref 0.88–1.16)
INR BLD: <0.9 RATIO — LOW (ref 0.88–1.16)
LDH SERPL L TO P-CCNC: 167 U/L — SIGNIFICANT CHANGE UP (ref 135–225)
LDH SERPL L TO P-CCNC: 217 U/L — SIGNIFICANT CHANGE UP (ref 135–225)
LYMPHOCYTES # BLD AUTO: 2.28 K/UL — SIGNIFICANT CHANGE UP (ref 1–3.3)
LYMPHOCYTES # BLD AUTO: 2.94 K/UL — SIGNIFICANT CHANGE UP (ref 1–3.3)
LYMPHOCYTES # BLD AUTO: 25.3 % — SIGNIFICANT CHANGE UP (ref 13–44)
LYMPHOCYTES # BLD AUTO: 28.7 % — SIGNIFICANT CHANGE UP (ref 13–44)
MCHC RBC-ENTMCNC: 26.2 PG — LOW (ref 27–34)
MCHC RBC-ENTMCNC: 26.4 PG — LOW (ref 27–34)
MCHC RBC-ENTMCNC: 31.8 GM/DL — LOW (ref 32–36)
MCHC RBC-ENTMCNC: 32.4 GM/DL — SIGNIFICANT CHANGE UP (ref 32–36)
MCV RBC AUTO: 81.3 FL — SIGNIFICANT CHANGE UP (ref 80–100)
MCV RBC AUTO: 82.4 FL — SIGNIFICANT CHANGE UP (ref 80–100)
MONOCYTES # BLD AUTO: 0.52 K/UL — SIGNIFICANT CHANGE UP (ref 0–0.9)
MONOCYTES # BLD AUTO: 0.54 K/UL — SIGNIFICANT CHANGE UP (ref 0–0.9)
MONOCYTES NFR BLD AUTO: 5.3 % — SIGNIFICANT CHANGE UP (ref 2–14)
MONOCYTES NFR BLD AUTO: 5.8 % — SIGNIFICANT CHANGE UP (ref 2–14)
NEUTROPHILS # BLD AUTO: 5.93 K/UL — SIGNIFICANT CHANGE UP (ref 1.8–7.4)
NEUTROPHILS # BLD AUTO: 6.52 K/UL — SIGNIFICANT CHANGE UP (ref 1.8–7.4)
NEUTROPHILS NFR BLD AUTO: 63.4 % — SIGNIFICANT CHANGE UP (ref 43–77)
NEUTROPHILS NFR BLD AUTO: 65.9 % — SIGNIFICANT CHANGE UP (ref 43–77)
NRBC # BLD: 0 /100 WBCS — SIGNIFICANT CHANGE UP (ref 0–0)
NRBC # BLD: 0 /100 WBCS — SIGNIFICANT CHANGE UP (ref 0–0)
NRBC # FLD: 0 K/UL — SIGNIFICANT CHANGE UP (ref 0–0)
NRBC # FLD: 0 K/UL — SIGNIFICANT CHANGE UP (ref 0–0)
PLATELET # BLD AUTO: 197 K/UL — SIGNIFICANT CHANGE UP (ref 150–400)
PLATELET # BLD AUTO: 216 K/UL — SIGNIFICANT CHANGE UP (ref 150–400)
POTASSIUM SERPL-MCNC: 4 MMOL/L — SIGNIFICANT CHANGE UP (ref 3.5–5.3)
POTASSIUM SERPL-MCNC: 4.1 MMOL/L — SIGNIFICANT CHANGE UP (ref 3.5–5.3)
POTASSIUM SERPL-SCNC: 4 MMOL/L — SIGNIFICANT CHANGE UP (ref 3.5–5.3)
POTASSIUM SERPL-SCNC: 4.1 MMOL/L — SIGNIFICANT CHANGE UP (ref 3.5–5.3)
PROT ?TM UR-MCNC: 14 MG/DL — SIGNIFICANT CHANGE UP
PROT SERPL-MCNC: 6.3 G/DL — SIGNIFICANT CHANGE UP (ref 6–8.3)
PROT SERPL-MCNC: 6.4 G/DL — SIGNIFICANT CHANGE UP (ref 6–8.3)
PROT/CREAT UR-RTO: 0.2 RATIO — SIGNIFICANT CHANGE UP (ref 0–0.2)
PROTHROM AB SERPL-ACNC: 10.1 SEC — LOW (ref 10.5–13.4)
PROTHROM AB SERPL-ACNC: 11.1 SEC — SIGNIFICANT CHANGE UP (ref 10.5–13.4)
RBC # BLD: 4.04 M/UL — SIGNIFICANT CHANGE UP (ref 3.8–5.2)
RBC # BLD: 4.06 M/UL — SIGNIFICANT CHANGE UP (ref 3.8–5.2)
RBC # FLD: 13.3 % — SIGNIFICANT CHANGE UP (ref 10.3–14.5)
RBC # FLD: 13.4 % — SIGNIFICANT CHANGE UP (ref 10.3–14.5)
RUBV IGG SER-ACNC: 1.1 INDEX — SIGNIFICANT CHANGE UP
RUBV IGG SER-IMP: POSITIVE — SIGNIFICANT CHANGE UP
SARS-COV-2 IGG+IGM SERPL QL IA: >250 U/ML — HIGH
SARS-COV-2 IGG+IGM SERPL QL IA: POSITIVE
SODIUM SERPL-SCNC: 134 MMOL/L — LOW (ref 135–145)
SODIUM SERPL-SCNC: 136 MMOL/L — SIGNIFICANT CHANGE UP (ref 135–145)
T PALLIDUM AB TITR SER: NEGATIVE — SIGNIFICANT CHANGE UP
URATE SERPL-MCNC: 6.5 MG/DL — SIGNIFICANT CHANGE UP (ref 2.5–7)
URATE SERPL-MCNC: 7.1 MG/DL — HIGH (ref 2.5–7)
WBC # BLD: 10.26 K/UL — SIGNIFICANT CHANGE UP (ref 3.8–10.5)
WBC # BLD: 9 K/UL — SIGNIFICANT CHANGE UP (ref 3.8–10.5)
WBC # FLD AUTO: 10.26 K/UL — SIGNIFICANT CHANGE UP (ref 3.8–10.5)
WBC # FLD AUTO: 9 K/UL — SIGNIFICANT CHANGE UP (ref 3.8–10.5)

## 2023-06-14 PROCEDURE — 99223 1ST HOSP IP/OBS HIGH 75: CPT | Mod: GC,25

## 2023-06-14 PROCEDURE — 76811 OB US DETAILED SNGL FETUS: CPT | Mod: 26

## 2023-06-14 PROCEDURE — 76820 UMBILICAL ARTERY ECHO: CPT | Mod: 26

## 2023-06-14 PROCEDURE — 99255 IP/OBS CONSLTJ NEW/EST HI 80: CPT | Mod: GC,25

## 2023-06-14 PROCEDURE — 76819 FETAL BIOPHYS PROFIL W/O NST: CPT | Mod: 26

## 2023-06-14 RX ORDER — HEPARIN SODIUM 5000 [USP'U]/ML
10000 INJECTION INTRAVENOUS; SUBCUTANEOUS EVERY 12 HOURS
Refills: 0 | Status: DISCONTINUED | OUTPATIENT
Start: 2023-06-14 | End: 2023-06-15

## 2023-06-14 RX ORDER — INSULIN LISPRO 100/ML
VIAL (ML) SUBCUTANEOUS
Refills: 0 | Status: DISCONTINUED | OUTPATIENT
Start: 2023-06-14 | End: 2023-06-15

## 2023-06-14 RX ORDER — DEXTROSE 50 % IN WATER 50 %
25 SYRINGE (ML) INTRAVENOUS ONCE
Refills: 0 | Status: DISCONTINUED | OUTPATIENT
Start: 2023-06-14 | End: 2023-06-15

## 2023-06-14 RX ORDER — SODIUM CHLORIDE 9 MG/ML
1000 INJECTION, SOLUTION INTRAVENOUS
Refills: 0 | Status: DISCONTINUED | OUTPATIENT
Start: 2023-06-14 | End: 2023-06-15

## 2023-06-14 RX ORDER — ALBUTEROL 90 UG/1
2 AEROSOL, METERED ORAL EVERY 6 HOURS
Refills: 0 | Status: DISCONTINUED | OUTPATIENT
Start: 2023-06-14 | End: 2023-06-15

## 2023-06-14 RX ORDER — SODIUM CHLORIDE 9 MG/ML
1000 INJECTION INTRAMUSCULAR; INTRAVENOUS; SUBCUTANEOUS
Refills: 0 | Status: DISCONTINUED | OUTPATIENT
Start: 2023-06-14 | End: 2023-06-19

## 2023-06-14 RX ORDER — SODIUM CHLORIDE 9 MG/ML
1000 INJECTION INTRAMUSCULAR; INTRAVENOUS; SUBCUTANEOUS
Refills: 0 | Status: DISCONTINUED | OUTPATIENT
Start: 2023-06-14 | End: 2023-06-14

## 2023-06-14 RX ORDER — SODIUM CHLORIDE 9 MG/ML
1000 INJECTION, SOLUTION INTRAVENOUS
Refills: 0 | Status: DISCONTINUED | OUTPATIENT
Start: 2023-06-14 | End: 2023-06-14

## 2023-06-14 RX ORDER — DEXTROSE 50 % IN WATER 50 %
15 SYRINGE (ML) INTRAVENOUS ONCE
Refills: 0 | Status: DISCONTINUED | OUTPATIENT
Start: 2023-06-14 | End: 2023-06-15

## 2023-06-14 RX ORDER — HUMAN INSULIN 100 [IU]/ML
18 INJECTION, SUSPENSION SUBCUTANEOUS AT BEDTIME
Refills: 0 | Status: DISCONTINUED | OUTPATIENT
Start: 2023-06-14 | End: 2023-06-15

## 2023-06-14 RX ORDER — LEVOTHYROXINE SODIUM 125 MCG
225 TABLET ORAL DAILY
Refills: 0 | Status: DISCONTINUED | OUTPATIENT
Start: 2023-06-14 | End: 2023-06-15

## 2023-06-14 RX ORDER — METOCLOPRAMIDE HCL 10 MG
10 TABLET ORAL ONCE
Refills: 0 | Status: COMPLETED | OUTPATIENT
Start: 2023-06-14 | End: 2023-06-14

## 2023-06-14 RX ORDER — DIPHENHYDRAMINE HCL 50 MG
25 CAPSULE ORAL ONCE
Refills: 0 | Status: COMPLETED | OUTPATIENT
Start: 2023-06-14 | End: 2023-06-14

## 2023-06-14 RX ORDER — ACETAMINOPHEN 500 MG
975 TABLET ORAL ONCE
Refills: 0 | Status: COMPLETED | OUTPATIENT
Start: 2023-06-14 | End: 2023-06-14

## 2023-06-14 RX ORDER — HEPARIN SODIUM 5000 [USP'U]/ML
5000 INJECTION INTRAVENOUS; SUBCUTANEOUS EVERY 12 HOURS
Refills: 0 | Status: DISCONTINUED | OUTPATIENT
Start: 2023-06-14 | End: 2023-06-14

## 2023-06-14 RX ORDER — INSULIN LISPRO 100/ML
10 VIAL (ML) SUBCUTANEOUS
Refills: 0 | Status: DISCONTINUED | OUTPATIENT
Start: 2023-06-14 | End: 2023-06-15

## 2023-06-14 RX ORDER — GLUCAGON INJECTION, SOLUTION 0.5 MG/.1ML
1 INJECTION, SOLUTION SUBCUTANEOUS ONCE
Refills: 0 | Status: DISCONTINUED | OUTPATIENT
Start: 2023-06-14 | End: 2023-06-15

## 2023-06-14 RX ORDER — ASPIRIN/CALCIUM CARB/MAGNESIUM 324 MG
81 TABLET ORAL DAILY
Refills: 0 | Status: DISCONTINUED | OUTPATIENT
Start: 2023-06-14 | End: 2023-06-15

## 2023-06-14 RX ORDER — TETANUS TOXOID, REDUCED DIPHTHERIA TOXOID AND ACELLULAR PERTUSSIS VACCINE, ADSORBED 5; 2.5; 8; 8; 2.5 [IU]/.5ML; [IU]/.5ML; UG/.5ML; UG/.5ML; UG/.5ML
0.5 SUSPENSION INTRAMUSCULAR ONCE
Refills: 0 | Status: COMPLETED | OUTPATIENT
Start: 2023-06-14 | End: 2023-06-14

## 2023-06-14 RX ADMIN — Medication 10 UNIT(S): at 17:14

## 2023-06-14 RX ADMIN — HUMAN INSULIN 18 UNIT(S): 100 INJECTION, SUSPENSION SUBCUTANEOUS at 22:30

## 2023-06-14 RX ADMIN — Medication 10 MILLIGRAM(S): at 12:28

## 2023-06-14 RX ADMIN — Medication 81 MILLIGRAM(S): at 13:43

## 2023-06-14 RX ADMIN — TETANUS TOXOID, REDUCED DIPHTHERIA TOXOID AND ACELLULAR PERTUSSIS VACCINE, ADSORBED 0.5 MILLILITER(S): 5; 2.5; 8; 8; 2.5 SUSPENSION INTRAMUSCULAR at 15:03

## 2023-06-14 RX ADMIN — Medication 975 MILLIGRAM(S): at 22:49

## 2023-06-14 RX ADMIN — Medication 975 MILLIGRAM(S): at 12:28

## 2023-06-14 RX ADMIN — Medication 975 MILLIGRAM(S): at 13:25

## 2023-06-14 RX ADMIN — Medication 1 TABLET(S): at 13:44

## 2023-06-14 RX ADMIN — Medication 225 MICROGRAM(S): at 06:10

## 2023-06-14 RX ADMIN — SODIUM CHLORIDE 125 MILLILITER(S): 9 INJECTION, SOLUTION INTRAVENOUS at 06:10

## 2023-06-14 RX ADMIN — Medication 25 MILLIGRAM(S): at 12:28

## 2023-06-14 NOTE — CHART NOTE - NSCHARTNOTEFT_GEN_A_CORE
PACU PA NOTE 8:50a  This is a 33yo  @ 35.2wks/Breech who was admitted for BP observation and possible c/s. c/o HA x 2 days not relieved w/Tylenol  Initial BP on admission 151/81, severe BP x 1 @ 11p 166/93.  HA now relieved w/Reglan and Pepcid.    BPs high 130'- low 150s/70s  Admission LFTs- wnl    Case discussed w/ Dr. Whittington to determine of patient may be discharged from PACU to A/P floor for further continued evaluation.  A/P:  -gHTN/A2 @35.6 wks  -d/c PACU to A/P floor  -24 hr urine collection  -deliver if any further severe BP  Tomy CHAVEZ
Patient given tylenol, reglan, benadryl for headache of 9/10 this afternoon. She denies visual changes, RUQ pain, nausea/vomiting, SOB. Headache resolved to 1/10 pain after medications given.       PE:  Vital Signs Last 24 Hrs  T(C): 36.8 (14 Jun 2023 13:14), Max: 36.9 (14 Jun 2023 08:42)  T(F): 98.3 (14 Jun 2023 13:14), Max: 98.42 (14 Jun 2023 08:42)  HR: 79 (14 Jun 2023 13:14) (54 - 95)  BP: 138/80 (14 Jun 2023 13:14) (104/55 - 166/93)  BP(mean): --  RR: 17 (14 Jun 2023 13:14) (16 - 19)  SpO2: 99% (14 Jun 2023 13:14) (90% - 100%)    Parameters below as of 14 Jun 2023 13:14  Patient On (Oxygen Delivery Method): room air    Abd: gravid soft NT  Ext: trace edema b/l LE  CV:rrr  Lungs: ctabl    Plan:  - continue to monitor closely  - If headache worsens/ does not resolve or patient has any further severe range BPs will be for delivery    d/w Dr Williams KEVIN-BC
R3 Chart Note     Patient seem at bedside shortly after receiving call from RN that she has had return of HA. This is now her third HA since presentation which prior has been resolved with Tylenol, Benadryl + Reglan. She otherwise denies chest pain, vision changes, RUQ pain.     Vital Signs Last 24 Hrs  T(C): 36.9 (2023 22:23), Max: 36.9 (2023 08:42)  T(F): 98.5 (2023 22:23), Max: 98.5 (2023 22:23)  HR: 75 (2023 22:23) (54 - 95)  BP: 143/79 (2023 22:23) (104/55 - 158/85)  BP(mean): --  RR: 19 (2023 22:23) (16 - 19)  SpO2: 100% (2023 22:23) (98% - 100%)    Parameters below as of 2023 22:23  Patient On (Oxygen Delivery Method): room air    General: well appearing, NAD   Pulmonary: breathing comfortably on room air   Cardiovascular: extremities well perfused    32 y.o.  presenting with elevated BPs meeting criteria for gHTN. She has had one severe range BP since presentation, however, patient with recurrent HAs (now 3rd) concerning for pre-eclampsia with severe features.     - NPO (last ate at 830p)  - Tylenol for HA   - begin Magnesium for seizure ppx   - for rLTCS   - stat HELLP labs    d/w Dr. Williams LIRA attending, Dr. Oconnor    Gisela Wyatt PGY-3

## 2023-06-14 NOTE — PROGRESS NOTE ADULT - SUBJECTIVE AND OBJECTIVE BOX
R3 Antepartum Progress Note  HD#2    Patient seen and examined at bedside, no acute overnight events. No acute complaints. Patient endorses good fetal movement, denies any loss of fluid, contractions. Patient is ambulating, currently NPO. Denies CP, SOB, N/V, fevers, chills, or any other concerns.    Vital Signs Last 24 Hours  T(C): 36.5 (06-14-23 @ 04:28), Max: 36.7 (06-13-23 @ 20:26)  HR: 66 (06-14-23 @ 04:56) (58 - 95)  BP: 137/79 (06-14-23 @ 04:56) (124/59 - 166/93)  RR: 18 (06-14-23 @ 04:28) (16 - 19)  SpO2: 100% (06-13-23 @ 22:29) (90% - 100%)    I&O's Summary    13 Jun 2023 07:01  -  14 Jun 2023 05:06  --------------------------------------------------------  IN: 1000 mL / OUT: 0 mL / NET: 1000 mL        Physical Exam:  General: NAD  CV: RR  Lungs: breathing comfortably on RA  Abdomen: soft, gravid, non-tender  Ext: no pain or swelling    Labs:             11.0<L>  10.11 )-----------( 217      ( 06-13 @ 20:44 )             32.4<L>               10.3<L>  8.86  )-----------( 200      ( 06-12 @ 16:30 )             31.0<L>    FHR: baseline 135, moderate variability, + accels, no decels   [employer]: quiet     MEDICATIONS  (STANDING):  lactated ringers. 1000 milliLiter(s) (125 mL/Hr) IV Continuous <Continuous>  levothyroxine 225 MICROGram(s) Oral daily  prenatal multivitamin 1 Tablet(s) Oral daily    MEDICATIONS  (PRN):

## 2023-06-14 NOTE — PROGRESS NOTE ADULT - ATTENDING COMMENTS
MFM ATTENDING    33yo P1 at 35w2d    tn  h/o c/s c/b superficial wound separation  headache  gdma2  hypothyroid    had headache last night, resolved, but at time of encounter had just returned. denies blurry vision / scotomata / ruq / epig pain    steroids held last night given late  and gdma2 on insulin    labs reviewed, normal.     will give acetaminophen / reglan / benadryl.   if headache not resolved, would indicate a severe feature of preeclampsia and would therefore recommend delivery   otherwise, complete 24 hour urine, bp monitoring.   had one severe BP last night. not on anti-hypertensive meds    Delivery planning:   Delivery 37 weeks or earlier if evidence of severe features, including if has another severe range BP or persistent headache not resolved with interventions.  Continue glucose monitoring  growth sono today  MOD: repeat

## 2023-06-15 ENCOUNTER — TRANSCRIPTION ENCOUNTER (OUTPATIENT)
Age: 33
End: 2023-06-15

## 2023-06-15 LAB
ALBUMIN SERPL ELPH-MCNC: 3 G/DL — LOW (ref 3.3–5)
ALP SERPL-CCNC: 128 U/L — HIGH (ref 40–120)
ALT FLD-CCNC: 8 U/L — SIGNIFICANT CHANGE UP (ref 4–33)
ANION GAP SERPL CALC-SCNC: 14 MMOL/L — SIGNIFICANT CHANGE UP (ref 7–14)
APTT BLD: 29.6 SEC — SIGNIFICANT CHANGE UP (ref 27–36.3)
AST SERPL-CCNC: 17 U/L — SIGNIFICANT CHANGE UP (ref 4–32)
BASOPHILS # BLD AUTO: 0.04 K/UL — SIGNIFICANT CHANGE UP (ref 0–0.2)
BASOPHILS NFR BLD AUTO: 0.2 % — SIGNIFICANT CHANGE UP (ref 0–2)
BILE AC FLD-MCNC: 3 UMOL/L — SIGNIFICANT CHANGE UP (ref 0–10)
BILIRUB SERPL-MCNC: <0.2 MG/DL — SIGNIFICANT CHANGE UP (ref 0.2–1.2)
BUN SERPL-MCNC: 8 MG/DL — SIGNIFICANT CHANGE UP (ref 7–23)
CALCIUM SERPL-MCNC: 7.9 MG/DL — LOW (ref 8.4–10.5)
CHLORIDE SERPL-SCNC: 103 MMOL/L — SIGNIFICANT CHANGE UP (ref 98–107)
CO2 SERPL-SCNC: 19 MMOL/L — LOW (ref 22–31)
CREAT SERPL-MCNC: 0.55 MG/DL — SIGNIFICANT CHANGE UP (ref 0.5–1.3)
EGFR: 125 ML/MIN/1.73M2 — SIGNIFICANT CHANGE UP
EOSINOPHIL # BLD AUTO: 0.02 K/UL — SIGNIFICANT CHANGE UP (ref 0–0.5)
EOSINOPHIL NFR BLD AUTO: 0.1 % — SIGNIFICANT CHANGE UP (ref 0–6)
GLUCOSE SERPL-MCNC: 104 MG/DL — HIGH (ref 70–99)
HCT VFR BLD CALC: 33.9 % — LOW (ref 34.5–45)
HGB BLD-MCNC: 10.9 G/DL — LOW (ref 11.5–15.5)
IANC: 14.9 K/UL — HIGH (ref 1.8–7.4)
IMM GRANULOCYTES NFR BLD AUTO: 0.9 % — SIGNIFICANT CHANGE UP (ref 0–0.9)
INR BLD: 0.91 RATIO — SIGNIFICANT CHANGE UP (ref 0.88–1.16)
LDH SERPL L TO P-CCNC: 226 U/L — HIGH (ref 135–225)
LYMPHOCYTES # BLD AUTO: 1.39 K/UL — SIGNIFICANT CHANGE UP (ref 1–3.3)
LYMPHOCYTES # BLD AUTO: 8.2 % — LOW (ref 13–44)
MAGNESIUM SERPL-MCNC: 4.3 MG/DL — HIGH (ref 1.6–2.6)
MAGNESIUM SERPL-MCNC: 5.5 MG/DL — HIGH (ref 1.6–2.6)
MAGNESIUM SERPL-MCNC: 5.5 MG/DL — HIGH (ref 1.6–2.6)
MCHC RBC-ENTMCNC: 26.6 PG — LOW (ref 27–34)
MCHC RBC-ENTMCNC: 32.2 GM/DL — SIGNIFICANT CHANGE UP (ref 32–36)
MCV RBC AUTO: 82.7 FL — SIGNIFICANT CHANGE UP (ref 80–100)
MONOCYTES # BLD AUTO: 0.43 K/UL — SIGNIFICANT CHANGE UP (ref 0–0.9)
MONOCYTES NFR BLD AUTO: 2.5 % — SIGNIFICANT CHANGE UP (ref 2–14)
NEUTROPHILS # BLD AUTO: 14.9 K/UL — HIGH (ref 1.8–7.4)
NEUTROPHILS NFR BLD AUTO: 88.1 % — HIGH (ref 43–77)
NRBC # BLD: 0 /100 WBCS — SIGNIFICANT CHANGE UP (ref 0–0)
NRBC # FLD: 0 K/UL — SIGNIFICANT CHANGE UP (ref 0–0)
PLATELET # BLD AUTO: 228 K/UL — SIGNIFICANT CHANGE UP (ref 150–400)
POTASSIUM SERPL-MCNC: 4.4 MMOL/L — SIGNIFICANT CHANGE UP (ref 3.5–5.3)
POTASSIUM SERPL-SCNC: 4.4 MMOL/L — SIGNIFICANT CHANGE UP (ref 3.5–5.3)
PROT SERPL-MCNC: 6.5 G/DL — SIGNIFICANT CHANGE UP (ref 6–8.3)
PROTHROM AB SERPL-ACNC: 10.5 SEC — SIGNIFICANT CHANGE UP (ref 10.5–13.4)
RBC # BLD: 4.1 M/UL — SIGNIFICANT CHANGE UP (ref 3.8–5.2)
RBC # FLD: 13.4 % — SIGNIFICANT CHANGE UP (ref 10.3–14.5)
SODIUM SERPL-SCNC: 136 MMOL/L — SIGNIFICANT CHANGE UP (ref 135–145)
URATE SERPL-MCNC: 6.7 MG/DL — SIGNIFICANT CHANGE UP (ref 2.5–7)
WBC # BLD: 16.93 K/UL — HIGH (ref 3.8–10.5)
WBC # FLD AUTO: 16.93 K/UL — HIGH (ref 3.8–10.5)

## 2023-06-15 PROCEDURE — 88307 TISSUE EXAM BY PATHOLOGIST: CPT | Mod: 26

## 2023-06-15 PROCEDURE — 59514 CESAREAN DELIVERY ONLY: CPT | Mod: U7,GC

## 2023-06-15 DEVICE — SURGICEL FIBRILLAR 1 X 2": Type: IMPLANTABLE DEVICE | Status: FUNCTIONAL

## 2023-06-15 RX ORDER — NALOXONE HYDROCHLORIDE 4 MG/.1ML
0.1 SPRAY NASAL
Refills: 0 | Status: DISCONTINUED | OUTPATIENT
Start: 2023-06-15 | End: 2023-06-19

## 2023-06-15 RX ORDER — ONDANSETRON 8 MG/1
4 TABLET, FILM COATED ORAL EVERY 6 HOURS
Refills: 0 | Status: DISCONTINUED | OUTPATIENT
Start: 2023-06-15 | End: 2023-06-19

## 2023-06-15 RX ORDER — DIPHENHYDRAMINE HCL 50 MG
25 CAPSULE ORAL EVERY 6 HOURS
Refills: 0 | Status: DISCONTINUED | OUTPATIENT
Start: 2023-06-15 | End: 2023-06-19

## 2023-06-15 RX ORDER — OXYCODONE HYDROCHLORIDE 5 MG/1
5 TABLET ORAL
Refills: 0 | Status: DISCONTINUED | OUTPATIENT
Start: 2023-06-15 | End: 2023-06-15

## 2023-06-15 RX ORDER — ACETAMINOPHEN 500 MG
975 TABLET ORAL
Refills: 0 | Status: DISCONTINUED | OUTPATIENT
Start: 2023-06-15 | End: 2023-06-19

## 2023-06-15 RX ORDER — KETOROLAC TROMETHAMINE 30 MG/ML
30 SYRINGE (ML) INJECTION EVERY 6 HOURS
Refills: 0 | Status: DISCONTINUED | OUTPATIENT
Start: 2023-06-15 | End: 2023-06-16

## 2023-06-15 RX ORDER — MORPHINE SULFATE 50 MG/1
0.1 CAPSULE, EXTENDED RELEASE ORAL ONCE
Refills: 0 | Status: DISCONTINUED | OUTPATIENT
Start: 2023-06-15 | End: 2023-06-15

## 2023-06-15 RX ORDER — OXYTOCIN 10 UNIT/ML
333.33 VIAL (ML) INJECTION
Qty: 20 | Refills: 0 | Status: DISCONTINUED | OUTPATIENT
Start: 2023-06-15 | End: 2023-06-15

## 2023-06-15 RX ORDER — OXYCODONE HYDROCHLORIDE 5 MG/1
5 TABLET ORAL
Refills: 0 | Status: DISCONTINUED | OUTPATIENT
Start: 2023-06-15 | End: 2023-06-19

## 2023-06-15 RX ORDER — MAGNESIUM SULFATE 500 MG/ML
2 VIAL (ML) INJECTION
Qty: 40 | Refills: 0 | Status: DISCONTINUED | OUTPATIENT
Start: 2023-06-15 | End: 2023-06-16

## 2023-06-15 RX ORDER — NALBUPHINE HYDROCHLORIDE 10 MG/ML
2.5 INJECTION, SOLUTION INTRAMUSCULAR; INTRAVENOUS; SUBCUTANEOUS EVERY 6 HOURS
Refills: 0 | Status: COMPLETED | OUTPATIENT
Start: 2023-06-15 | End: 2023-06-16

## 2023-06-15 RX ORDER — NIFEDIPINE 30 MG
10 TABLET, EXTENDED RELEASE 24 HR ORAL ONCE
Refills: 0 | Status: COMPLETED | OUTPATIENT
Start: 2023-06-15 | End: 2023-06-15

## 2023-06-15 RX ORDER — IBUPROFEN 200 MG
600 TABLET ORAL EVERY 6 HOURS
Refills: 0 | Status: COMPLETED | OUTPATIENT
Start: 2023-06-15 | End: 2024-05-13

## 2023-06-15 RX ORDER — MAGNESIUM HYDROXIDE 400 MG/1
30 TABLET, CHEWABLE ORAL
Refills: 0 | Status: DISCONTINUED | OUTPATIENT
Start: 2023-06-15 | End: 2023-06-19

## 2023-06-15 RX ORDER — SIMETHICONE 80 MG/1
80 TABLET, CHEWABLE ORAL EVERY 4 HOURS
Refills: 0 | Status: DISCONTINUED | OUTPATIENT
Start: 2023-06-15 | End: 2023-06-19

## 2023-06-15 RX ORDER — SODIUM CHLORIDE 9 MG/ML
1000 INJECTION, SOLUTION INTRAVENOUS
Refills: 0 | Status: DISCONTINUED | OUTPATIENT
Start: 2023-06-15 | End: 2023-06-19

## 2023-06-15 RX ORDER — NIFEDIPINE 30 MG
10 TABLET, EXTENDED RELEASE 24 HR ORAL
Refills: 0 | Status: DISCONTINUED | OUTPATIENT
Start: 2023-06-15 | End: 2023-06-15

## 2023-06-15 RX ORDER — DEXAMETHASONE 0.5 MG/5ML
4 ELIXIR ORAL EVERY 6 HOURS
Refills: 0 | Status: DISCONTINUED | OUTPATIENT
Start: 2023-06-15 | End: 2023-06-19

## 2023-06-15 RX ORDER — SODIUM CHLORIDE 9 MG/ML
1000 INJECTION, SOLUTION INTRAVENOUS
Refills: 0 | Status: DISCONTINUED | OUTPATIENT
Start: 2023-06-15 | End: 2023-06-15

## 2023-06-15 RX ORDER — LEVOTHYROXINE SODIUM 125 MCG
225 TABLET ORAL DAILY
Refills: 0 | Status: DISCONTINUED | OUTPATIENT
Start: 2023-06-15 | End: 2023-06-19

## 2023-06-15 RX ORDER — ACETAMINOPHEN 500 MG
1000 TABLET ORAL ONCE
Refills: 0 | Status: COMPLETED | OUTPATIENT
Start: 2023-06-15 | End: 2023-06-15

## 2023-06-15 RX ORDER — TETANUS TOXOID, REDUCED DIPHTHERIA TOXOID AND ACELLULAR PERTUSSIS VACCINE, ADSORBED 5; 2.5; 8; 8; 2.5 [IU]/.5ML; [IU]/.5ML; UG/.5ML; UG/.5ML; UG/.5ML
0.5 SUSPENSION INTRAMUSCULAR ONCE
Refills: 0 | Status: DISCONTINUED | OUTPATIENT
Start: 2023-06-15 | End: 2023-06-19

## 2023-06-15 RX ORDER — LANOLIN
1 OINTMENT (GRAM) TOPICAL EVERY 6 HOURS
Refills: 0 | Status: DISCONTINUED | OUTPATIENT
Start: 2023-06-15 | End: 2023-06-19

## 2023-06-15 RX ORDER — NIFEDIPINE 30 MG
30 TABLET, EXTENDED RELEASE 24 HR ORAL EVERY 24 HOURS
Refills: 0 | Status: DISCONTINUED | OUTPATIENT
Start: 2023-06-15 | End: 2023-06-19

## 2023-06-15 RX ORDER — MAGNESIUM SULFATE 500 MG/ML
4 VIAL (ML) INJECTION ONCE
Refills: 0 | Status: COMPLETED | OUTPATIENT
Start: 2023-06-15 | End: 2023-06-15

## 2023-06-15 RX ORDER — HEPARIN SODIUM 5000 [USP'U]/ML
10000 INJECTION INTRAVENOUS; SUBCUTANEOUS EVERY 12 HOURS
Refills: 0 | Status: DISCONTINUED | OUTPATIENT
Start: 2023-06-15 | End: 2023-06-19

## 2023-06-15 RX ORDER — OXYCODONE HYDROCHLORIDE 5 MG/1
5 TABLET ORAL ONCE
Refills: 0 | Status: DISCONTINUED | OUTPATIENT
Start: 2023-06-15 | End: 2023-06-19

## 2023-06-15 RX ADMIN — Medication 975 MILLIGRAM(S): at 13:45

## 2023-06-15 RX ADMIN — Medication 30 MILLIGRAM(S): at 17:55

## 2023-06-15 RX ADMIN — NALBUPHINE HYDROCHLORIDE 2.5 MILLIGRAM(S): 10 INJECTION, SOLUTION INTRAMUSCULAR; INTRAVENOUS; SUBCUTANEOUS at 17:44

## 2023-06-15 RX ADMIN — NALBUPHINE HYDROCHLORIDE 2.5 MILLIGRAM(S): 10 INJECTION, SOLUTION INTRAMUSCULAR; INTRAVENOUS; SUBCUTANEOUS at 18:10

## 2023-06-15 RX ADMIN — Medication 30 MILLIGRAM(S): at 07:59

## 2023-06-15 RX ADMIN — Medication 30 MILLIGRAM(S): at 12:35

## 2023-06-15 RX ADMIN — Medication 50 GM/HR: at 02:57

## 2023-06-15 RX ADMIN — Medication 1000 MILLIUNIT(S)/MIN: at 06:46

## 2023-06-15 RX ADMIN — Medication 30 MILLIGRAM(S): at 17:34

## 2023-06-15 RX ADMIN — Medication 50 GM/HR: at 10:11

## 2023-06-15 RX ADMIN — Medication 30 MILLIGRAM(S): at 23:52

## 2023-06-15 RX ADMIN — Medication 50 GM/HR: at 19:17

## 2023-06-15 RX ADMIN — Medication 10 MILLIGRAM(S): at 03:11

## 2023-06-15 RX ADMIN — Medication 400 MILLIGRAM(S): at 07:59

## 2023-06-15 RX ADMIN — HEPARIN SODIUM 10000 UNIT(S): 5000 INJECTION INTRAVENOUS; SUBCUTANEOUS at 23:52

## 2023-06-15 RX ADMIN — Medication 975 MILLIGRAM(S): at 14:40

## 2023-06-15 RX ADMIN — HEPARIN SODIUM 10000 UNIT(S): 5000 INJECTION INTRAVENOUS; SUBCUTANEOUS at 12:02

## 2023-06-15 RX ADMIN — Medication 50 GM/HR: at 06:45

## 2023-06-15 RX ADMIN — Medication 50 GM/HR: at 07:29

## 2023-06-15 RX ADMIN — Medication 300 GRAM(S): at 02:32

## 2023-06-15 RX ADMIN — Medication 10 MILLIGRAM(S): at 03:35

## 2023-06-15 RX ADMIN — Medication 975 MILLIGRAM(S): at 20:13

## 2023-06-15 RX ADMIN — Medication 1 TABLET(S): at 12:14

## 2023-06-15 RX ADMIN — Medication 975 MILLIGRAM(S): at 20:43

## 2023-06-15 RX ADMIN — Medication 30 MILLIGRAM(S): at 12:14

## 2023-06-15 NOTE — OB NEONATOLOGY/PEDIATRICIAN DELIVERY SUMMARY - NSPEDSNEONOTESA_OBGYN_ALL_OB_FT
Pediatrician called to delivery for unscheduled cs for maternal preE w/ SF, IGUR (7th %ile), and breech presentation. Male infant born at 35 3/7 wks via  to a 31 y/o  blood type A+ mother. Maternal history of pre-E w/ sf (magnesium therapy started at approx 0200 on 6/15), hypothyroid (on levothyroxine 225mcg po qd), and GDMA2 (on insulin). Prenatal history of IGUR (7th %ile) and breech presentation. Prenatal labs nr/immune/-, GBS unknown (negative per patient, via outside provider). AROM at delivery on 6/15 with clear fluids. EOS score n/a, highest maternal temperature 36.9.     Baby emerged vigorous, crying. Infant was brought to radiant warmer and warmed, dried, stimulated and suctioned. HR>100, normal respiratory effort. APGARS of 8/9 (-1 for color). Mom is initiating breast feeding. Consents to Hepatitis B vaccination. Desires for infant to be circumcised. Pediatrician is Dr. Griffin.    BW: 2160g  : 6/15/23  TOB: 0506

## 2023-06-15 NOTE — LACTATION INITIAL EVALUATION - INTERVENTION OUTCOME
reviewed  algorithim  for  late    with  mother ./verbalizes understanding/Lactation team to follow up

## 2023-06-15 NOTE — DISCHARGE NOTE OB - PROVIDER RX CONTACT NUMBER
Initiate Treatment: AM:\\nSA Wash\\nClindamycin-BPO\\nMoisturizer with spf\\n\\nPM:\\nSA Wash\\nTretinoin 0.025%\\nMoisturize [phone]

## 2023-06-15 NOTE — OB PROVIDER DELIVERY SUMMARY - NSLOWPPHRISK_OBGYN_A_OB
Young Pregnancy/Less than or equal to 4 previous vaginal births/No known bleeding disorder/No history of postpartum hemorrhage/No other PPH risks indicated

## 2023-06-15 NOTE — DISCHARGE NOTE OB - HOSPITAL COURSE
Patient had uncomplicated repeat low transverse  section for preeclampsia with severe features by severe range BPs and headache.  Please see operative note for details.  During postpartum course patient's vitals were stable, vaginal bleeding appropriate, and pain well controlled.  Post operation day one hematocrit was appropriate.  On day of discharge patient was ambulating, her pain controlled with oral medications, had adequate oral intake, and was voiding freely.  Discharge instructions and precautions were given. Will return to [facility] in 2-3 days for BP check, then in 2 weeks for incision check, then in 6 weeks for full postpartum visit.  Postpartum birth control plan is ____.  Patient had uncomplicated repeat low transverse  section for preeclampsia with severe features by severe range BPs and headache.  Please see operative note for details.  During postpartum course patient's vitals were stable, vaginal bleeding appropriate, and pain well controlled.  Post operation day one hematocrit was appropriate.  On day of discharge patient was ambulating, her pain controlled with oral medications, had adequate oral intake, and was voiding freely.  Discharge instructions and precautions were given. Will return to [facility] in 2-3 days for BP check, then in 2 weeks for incision check, then in 6 weeks for full postpartum visit.  Patient had uncomplicated repeat low transverse  section for preeclampsia with severe features by severe range BPs and headache.  Please see operative note for details.  During postpartum course patient's vitals were stable, vaginal bleeding appropriate, and pain well controlled.  Post operation day one hematocrit was appropriate. Area of ecchymosis seen on the mons pubis + abdomen. H/H was repeated once it stopped expanding and was found to be stable. On day of discharge patient was ambulating, her pain controlled with oral medications, had adequate oral intake, and was voiding freely.  Discharge instructions and precautions were given. Will return to [facility] in 2-3 days for BP check, then in 2 weeks for incision check, then in 6 weeks for full postpartum visit.  Patient had uncomplicated repeat low transverse  section for preeclampsia with severe features by severe range BPs and headache.  Please see operative note for details.  During postpartum course patient's blood pressures were well controlled on procardia 30mg xl qd, vaginal bleeding appropriate, and pain well controlled.  Post operation day one hematocrit was appropriate. Area of ecchymosis seen on the mons pubis + abdomen. H/H was repeated and found to be stable. On day of discharge patient was ambulating, her pain controlled with oral medications, had adequate oral intake, and was voiding freely.  Discharge instructions and precautions were given. Will return to [facility] in 2-3 days for BP check, then in 2 weeks for incision check, then in 6 weeks for full postpartum visit and glucose tolerance test.

## 2023-06-15 NOTE — OB RN DELIVERY SUMMARY - NS_SEPSISRSKCALC_OBGYN_ALL_OB_FT
EOS calculated successfully. EOS Risk Factor: 0.5/1000 live births ([facility] national incidence); GA=35w3d; Temp=98.42; ROM=0; GBS='Unknown'; Antibiotics='No antibiotics or any antibiotics < 2 hrs prior to birth'

## 2023-06-15 NOTE — OB PROVIDER DELIVERY SUMMARY - NSPROVIDERDELIVERYNOTE_OBGYN_ALL_OB_FT
.  .  .  Repeat low transverse  section via a modified Carlos-Gillis technique under spinal anesthesia for severe preeclampsia, prior  delivery and breech presentation. Male infant delivered from LSA as Austin breech. Cord clamped and cut, infant passed to Pediatrics in attendance. Segment of cord isolated for cord gases. Placenta expressed from the endometrium intact. Hysterotomy closed with a continuous modified mattress stitch using 1-Caprosyn suture. Skin closed with a subcuticular stitch. EBL **cc, IVF **cc, U/O **cc. Infant and mother transferred to PACU in stable condition.  Vinh Fraga M.D. .  .  .  Repeat low transverse  section via a modified Acrlos-Gillis technique under spinal anesthesia for severe preeclampsia, prior  delivery and breech presentation. JENNI between uterine lower segment and anterior abdominal wall. Male infant delivered from LSA as Austin breech. Cord clamped and cut, infant passed to Pediatrics in attendance. Segment of cord isolated for cord gases. Placenta expressed from the endometrium intact. Hysterotomy closed with a continuous modified mattress stitch using 1-Caprosyn suture. Fibrillar placed on hysterotomy site. Skin closed with a subcuticular stitch. EBL **cc, IVF **cc, U/O **cc. Infant and mother transferred to PACU in stable condition.  Vinh Fraga M.D. Patient met criteria for sPEC due to worsening HA and recurrent severe range BPs. Due to prior  delivery and breech presentation, decision made to proceed with repeat  delivery.     Repeat low transverse  section via a modified Carlos-Gillis technique under spinal anesthesia for severe preeclampsia, prior  delivery and breech presentation. JENNI between uterine lower segment and anterior abdominal wall. Male infant delivered from LSA as Austin breech. Cord clamped and cut, infant passed to Pediatrics in attendance. Segment of cord isolated for cord gases. Placenta expressed from the endometrium intact. Hysterotomy closed with a continuous modified mattress stitch using 1-Caprosyn suture. Fibrillar placed on hysterotomy site. Skin closed with a subcuticular stitch. EBL 339cc, IVF 1700cc, U/O 385cc. Infant and mother transferred to PACU in stable condition.  Vinh Fraga M.D.    Dictation #: [id number]  Gisela Wyatt PGY-3 Patient met criteria for sPEC due to worsening HA and recurrent severe range BPs. Due to prior  delivery and breech presentation, decision made to proceed with repeat  delivery.   .  Repeat low transverse  section via a modified Carlos-Gillis technique under spinal anesthesia for severe preeclampsia, prior  delivery and breech presentation. JENNI between uterine lower segment and anterior abdominal wall. Male infant delivered from LSA as Austin breech. Cord clamped and cut, infant passed to Pediatrics in attendance. Segment of cord isolated for cord gases. Placenta expressed from the endometrium intact. Hysterotomy closed with a continuous modified mattress stitch using 1-Caprosyn suture. Fibrillar placed on hysterotomy site. Skin closed with a subcuticular stitch. EBL 339cc, IVF 1700cc, U/O 385cc. Infant and mother transferred to PACU in stable condition.  Vinh Fraga M.D.    Dictation #: [id number]  Gisela Wyatt PGY-3

## 2023-06-15 NOTE — DISCHARGE NOTE OB - MATERIALS PROVIDED
[facility] [place] Screening Program/[place]  Immunization Record/Guide to Postpartum Care/Shaken Baby Prevention Handout/Discharge Medication Information for Patients and Families Pocket Guide

## 2023-06-15 NOTE — DISCHARGE NOTE OB - PLAN OF CARE
After discharge, please stay on pelvic rest for 6 weeks, meaning no sexual intercourse, no tampons and no douching.  No driving for 2 weeks as women can loose a lot of blood during delivery and there is a possibility of being lightheaded/fainting.  No lifting objects heavier than baby for two weeks.  Expect to have vaginal bleeding/spotting for up to six weeks.  The bleeding should get lighter and more white/light brown with time.  For bleeding soaking more than a pad an hour or passing clots greater than the size of your fist, come in to the emergency department.    Follow up with [facility] in 2-3 days for BP check, then in 2 weeks for incision check.  Call clinic for noticeable increase in redness or swelling at incision, discharge from incision, or opening of skin at incision site. Follow up with your OB in 48-72 hours for a blood pressure check. Check your blood pressure daily and keep a log of all values. Bring this to your appointments. If your blood pressure is > 140/90, call your doctor. If you experience headache not relieved with Tylenol, visual changes, or epigastric pain, call you doctor. If your blood pressure is > 160/110, please report immediately to L&D triage at the hospital.

## 2023-06-15 NOTE — DISCHARGE NOTE OB - COMMUNITY RESOURCE CONTACT NUMBER:
Patient to call and schedule baby's first visit appointment with patient's preferred pediatrician Dr. Most Fanta MD  [employer] [address] [place] [id number] [phone] so that baby is evaluated by pediatrician 1 to 2 days after hospital discharge.

## 2023-06-15 NOTE — DISCHARGE NOTE OB - PROVIDER TOKENS
FREE:[LAST:[[facility]],PHONE:[phone]],FAX:[(   )    -],ADDRESS:[[address] [place] [address]]]

## 2023-06-15 NOTE — DISCHARGE NOTE OB - ADDITIONAL INSTRUCTIONS
continue to take Blood pressure medication  Make 1 week blood pressure appointment with OBGYN  Make 6 week post partum appointment with OBGYN

## 2023-06-15 NOTE — DISCHARGE NOTE OB - MEDICATION SUMMARY - MEDICATIONS TO TAKE
I will START or STAY ON the medications listed below when I get home from the hospital:    blood pressure machine  -- Apply on skin to affected area 3 times a day  -- Indication: For preeclampsia    Levothyroxine 225 mcgs daily  -- Indication: For home med    ibuprofen 600 mg oral tablet  -- 1 tab(s) by mouth every 6 hours  -- Indication: For pain    acetaminophen 325 mg oral tablet  -- 3 tab(s) by mouth every 6 hours  -- Indication: For pain    NIFEdipine 30 mg oral tablet, extended release  -- 1 tab(s) by mouth every 24 hours  -- Indication: For preeclampsia    Prena1 oral capsule  -- 1 cap(s) by mouth once a day  -- Indication: For home med    ferrous sulfate 325 mg (65 mg elemental iron) oral tablet  -- 1 tab(s) by mouth once a day  -- Indication: For home med    levothyroxine 200 mcg (0.2 mg) oral tablet  -- 1 tab(s) by mouth once a day  -- Indication: For home med    ascorbic acid 500 mg oral tablet  -- 1 tab(s) by mouth once a day  -- Indication: For home med   I will START or STAY ON the medications listed below when I get home from the hospital:    blood pressure machine  -- Apply on skin to affected area 3 times a day  -- Indication: For preeclampsia with severe features    Levothyroxine 225 mcgs daily  -- Indication: For hypothyroidism    ibuprofen 600 mg oral tablet  -- 1 tab(s) by mouth every 6 hours  -- Indication: For pain    acetaminophen 325 mg oral tablet  -- 3 tab(s) by mouth every 6 hours  -- Indication: For pain    NIFEdipine 30 mg oral tablet, extended release  -- 1 tab(s) by mouth every 24 hours Do not take for blood pressures less than 110/70  -- Indication: For preeclampsia with severe features    Prena1 oral capsule  -- 1 cap(s) by mouth once a day  -- Indication: For vitamins    ferrous sulfate 325 mg (65 mg elemental iron) oral tablet  -- 1 tab(s) by mouth once a day  -- Indication: For pain    ascorbic acid 500 mg oral tablet  -- 1 tab(s) by mouth once a day  -- Indication: For vitamins

## 2023-06-15 NOTE — DISCHARGE NOTE OB - CARE PLAN
1 Principal Discharge DX:	Delivery of pregnancy by  section  Assessment and plan of treatment:	After discharge, please stay on pelvic rest for 6 weeks, meaning no sexual intercourse, no tampons and no douching.  No driving for 2 weeks as women can loose a lot of blood during delivery and there is a possibility of being lightheaded/fainting.  No lifting objects heavier than baby for two weeks.  Expect to have vaginal bleeding/spotting for up to six weeks.  The bleeding should get lighter and more white/light brown with time.  For bleeding soaking more than a pad an hour or passing clots greater than the size of your fist, come in to the emergency department.    Follow up with [facility] in 2-3 days for BP check, then in 2 weeks for incision check.  Call clinic for noticeable increase in redness or swelling at incision, discharge from incision, or opening of skin at incision site.  Secondary Diagnosis:	Severe preeclampsia  Assessment and plan of treatment:	Follow up with your OB in 48-72 hours for a blood pressure check. Check your blood pressure daily and keep a log of all values. Bring this to your appointments. If your blood pressure is > 140/90, call your doctor. If you experience headache not relieved with Tylenol, visual changes, or epigastric pain, call you doctor. If your blood pressure is > 160/110, please report immediately to L&D triage at the hospital.

## 2023-06-15 NOTE — OB RN INTRAOPERATIVE NOTE - NSSELHIDDEN_OBGYN_ALL_OB_FT
[NS_DeliveryAttending1_OBGYN_ALL_OB_FT:[id number]],[NS_DeliveryAssist1_OBGYN_ALL_OB_FT:[id number]],[NS_DeliveryRN_OBGYN_ALL_OB_FT:[id number]]

## 2023-06-15 NOTE — DISCHARGE NOTE OB - MEDICATION SUMMARY - MEDICATIONS TO STOP TAKING
I will STOP taking the medications listed below when I get home from the hospital:    Low Dose ASA 81 mg oral tablet    metFORMIN 500 mg oral tablet  -- 1 tab(s) by mouth 2 times a day    Insulin 10 units at dinner, 18 units at bedtime.   I will STOP taking the medications listed below when I get home from the hospital:    Low Dose ASA 81 mg oral tablet    levothyroxine 200 mcg (0.2 mg) oral tablet  -- 1 tab(s) by mouth once a day    metFORMIN 500 mg oral tablet  -- 1 tab(s) by mouth 2 times a day    Insulin 10 units at dinner, 18 units at bedtime.

## 2023-06-15 NOTE — DISCHARGE NOTE OB - NS MD DC FALL RISK RISK
For information on Fall & Injury Prevention, visit: [email or web address] OR  [email or web address] OR  https://www.cdc.gov/steadi/patient.html

## 2023-06-15 NOTE — OB PROVIDER DELIVERY SUMMARY - NSSELHIDDEN_OBGYN_ALL_OB_FT
[NS_DeliveryAttending1_OBGYN_ALL_OB_FT:[id number]],[NS_DeliveryAssist1_OBGYN_ALL_OB_FT:[id number]],[NS_DeliveryRN_OBGYN_ALL_OB_FT:[id number]=]

## 2023-06-15 NOTE — LACTATION INITIAL EVALUATION - LACTATION INTERVENTIONS
instructed  to  triple  feed as nbn  is  late    35.3  weeks/initiate/review safe skin-to-skin/initiate/review hand expression/initiate/review pumping guidelines and safe milk handling/initiate/review breast massage/compression/reviewed components of an effective feeding and at least 8 effective feedings per day required/reviewed importance of monitoring infant diapers, the breastfeeding log, and minimum output each day/reviewed risks of unnecessary formula supplementation/reviewed strategies to transition to breastfeeding only/reviewed benefits and recommendations for rooming in/reviewed indications of inadequate milk transfer that would require supplementation

## 2023-06-15 NOTE — OB RN DELIVERY SUMMARY - NSSKINTOSKINA_OBGYN_ALL_OB_END_DATE
CHIEF COMPLAINT:  Chief Complaint   Patient presents with   • Sinus Problem       HISTORY OF PRESENT ILLNESS:  Yuli is a 52 year old female who presents with complaint of sinus pressure and nasal congestion for the past 8 weeks. She reports sinus pressure, nasal congestion, and postnasal drainage. She denies fever or chills. She reports a history of chronic sinusitis.     HISTORY/MEDICATIONS/ALLERGIES:  Reviewed in Epic.    REVIEW OF SYSTEMS:  As per History of Present Illness. The rest of the cardiovascular, respiratory, gastrointestinal, neurologic, urinary and musculoskeletal and all other systems are reviewed and are negative.      PHYSICAL EXAM:  Visit Vitals  /68   Pulse 80   Temp 98.4 °F (36.9 °C) (Oral)   Wt 57.2 kg   LMP 12/11/2015   BMI 21.31 kg/m²     GENERAL APPEARANCE: Healthy, alert, in no distress and cooperative  HEAD: Normocephalic. Atraumatic. No masses, lesions, tenderness or abnormalities noted.  EYES: Pupils equal, round, and reactive to light. Extraocular movements are intact. Conjunctivae clear.  EARS: Normal external ears and canals. Tympanic membranes clear bilaterally, normal light reflex.  NOSE: Nares normal. Septum midline. Mucosa normal.  Moderate erythema and drainage.  THROAT: Moist mucous membranes, no tonsillar hypertrophy, uvula midline, mild erythema, no exudates or petechiae.   SINUS: Maxillary and frontal sinuses are nontender to palpation.  NECK: Supple. No cervical or supraclavicular lymphadenopathy  LUNGS: Lungs are clear to auscultation. No wheezes, rales or rhonchi. Chest symmetric with normal respiratory effort.    HEART: S1, S2, regular rate and rhythm.    IMPRESSION:  1. Acute bacterial sinusitis        PLAN:  Antibiotic as per orders. Symptomatic treatment with over-the-counter medications discussed. Push fluids and rest. Return to clinic if symptoms persist, worsen, or new symptoms develop.        Orders Placed This Encounter   • amoxicillin-clavulanate (AUGMENTIN)  875-125 MG per tablet      Medication risk and benefits, proper use, and potential side effects discussed. Patient verbalizes understanding.    Instructions provided as documented in the after visit summary.     The patient indicated understanding of the diagnosis and agreed with the plan of care.     15-Rom-2023 07:00

## 2023-06-15 NOTE — DISCHARGE NOTE OB - PATIENT PORTAL LINK FT
You can access the FollowMyHealth Patient Portal offered by [facility] by registering at the following website: [email or web address]. By joining [facility]’s FollowMyHealth portal, you will also be able to view your health information using other applications (apps) compatible with our system.

## 2023-06-15 NOTE — DISCHARGE NOTE OB - CARE PROVIDER_API CALL
Halstead BARBIE,   [address] [place] [address]  Phone: [phone]  Fax: (   )    -  Follow Up Time:

## 2023-06-16 LAB
ALBUMIN SERPL ELPH-MCNC: 3 G/DL — LOW (ref 3.3–5)
ALP SERPL-CCNC: 105 U/L — SIGNIFICANT CHANGE UP (ref 40–120)
ALT FLD-CCNC: 10 U/L — SIGNIFICANT CHANGE UP (ref 4–33)
ANION GAP SERPL CALC-SCNC: 11 MMOL/L — SIGNIFICANT CHANGE UP (ref 7–14)
APTT BLD: 33.8 SEC — SIGNIFICANT CHANGE UP (ref 27–36.3)
AST SERPL-CCNC: 25 U/L — SIGNIFICANT CHANGE UP (ref 4–32)
BASOPHILS # BLD AUTO: 0.03 K/UL — SIGNIFICANT CHANGE UP (ref 0–0.2)
BASOPHILS NFR BLD AUTO: 0.4 % — SIGNIFICANT CHANGE UP (ref 0–2)
BILIRUB SERPL-MCNC: <0.2 MG/DL — SIGNIFICANT CHANGE UP (ref 0.2–1.2)
BUN SERPL-MCNC: 6 MG/DL — LOW (ref 7–23)
CALCIUM SERPL-MCNC: 6.8 MG/DL — LOW (ref 8.4–10.5)
CHLORIDE SERPL-SCNC: 102 MMOL/L — SIGNIFICANT CHANGE UP (ref 98–107)
CO2 SERPL-SCNC: 22 MMOL/L — SIGNIFICANT CHANGE UP (ref 22–31)
CREAT SERPL-MCNC: 0.64 MG/DL — SIGNIFICANT CHANGE UP (ref 0.5–1.3)
EGFR: 120 ML/MIN/1.73M2 — SIGNIFICANT CHANGE UP
EOSINOPHIL # BLD AUTO: 0.09 K/UL — SIGNIFICANT CHANGE UP (ref 0–0.5)
EOSINOPHIL NFR BLD AUTO: 1.1 % — SIGNIFICANT CHANGE UP (ref 0–6)
FIBRINOGEN PPP-MCNC: 491 MG/DL — HIGH (ref 200–465)
GLUCOSE SERPL-MCNC: 123 MG/DL — HIGH (ref 70–99)
HCT VFR BLD CALC: 25.4 % — LOW (ref 34.5–45)
HGB BLD-MCNC: 8.2 G/DL — LOW (ref 11.5–15.5)
IANC: 5.29 K/UL — SIGNIFICANT CHANGE UP (ref 1.8–7.4)
IMM GRANULOCYTES NFR BLD AUTO: 0.8 % — SIGNIFICANT CHANGE UP (ref 0–0.9)
INR BLD: 0.91 RATIO — SIGNIFICANT CHANGE UP (ref 0.88–1.16)
LDH SERPL L TO P-CCNC: 278 U/L — HIGH (ref 135–225)
LYMPHOCYTES # BLD AUTO: 2.34 K/UL — SIGNIFICANT CHANGE UP (ref 1–3.3)
LYMPHOCYTES # BLD AUTO: 27.8 % — SIGNIFICANT CHANGE UP (ref 13–44)
MAGNESIUM SERPL-MCNC: 5.6 MG/DL — HIGH (ref 1.6–2.6)
MCHC RBC-ENTMCNC: 26.8 PG — LOW (ref 27–34)
MCHC RBC-ENTMCNC: 32.3 GM/DL — SIGNIFICANT CHANGE UP (ref 32–36)
MCV RBC AUTO: 83 FL — SIGNIFICANT CHANGE UP (ref 80–100)
MONOCYTES # BLD AUTO: 0.61 K/UL — SIGNIFICANT CHANGE UP (ref 0–0.9)
MONOCYTES NFR BLD AUTO: 7.2 % — SIGNIFICANT CHANGE UP (ref 2–14)
NEUTROPHILS # BLD AUTO: 5.29 K/UL — SIGNIFICANT CHANGE UP (ref 1.8–7.4)
NEUTROPHILS NFR BLD AUTO: 62.7 % — SIGNIFICANT CHANGE UP (ref 43–77)
NRBC # BLD: 0 /100 WBCS — SIGNIFICANT CHANGE UP (ref 0–0)
NRBC # FLD: 0.02 K/UL — HIGH (ref 0–0)
PLATELET # BLD AUTO: 213 K/UL — SIGNIFICANT CHANGE UP (ref 150–400)
POTASSIUM SERPL-MCNC: 3.8 MMOL/L — SIGNIFICANT CHANGE UP (ref 3.5–5.3)
POTASSIUM SERPL-SCNC: 3.8 MMOL/L — SIGNIFICANT CHANGE UP (ref 3.5–5.3)
PROT SERPL-MCNC: 6.5 G/DL — SIGNIFICANT CHANGE UP (ref 6–8.3)
PROTHROM AB SERPL-ACNC: 10.5 SEC — SIGNIFICANT CHANGE UP (ref 10.5–13.4)
RBC # BLD: 3.06 M/UL — LOW (ref 3.8–5.2)
RBC # FLD: 13.9 % — SIGNIFICANT CHANGE UP (ref 10.3–14.5)
SODIUM SERPL-SCNC: 135 MMOL/L — SIGNIFICANT CHANGE UP (ref 135–145)
URATE SERPL-MCNC: 6.6 MG/DL — SIGNIFICANT CHANGE UP (ref 2.5–7)
WBC # BLD: 8.43 K/UL — SIGNIFICANT CHANGE UP (ref 3.8–10.5)
WBC # FLD AUTO: 8.43 K/UL — SIGNIFICANT CHANGE UP (ref 3.8–10.5)

## 2023-06-16 RX ORDER — ASPIRIN/CALCIUM CARB/MAGNESIUM 324 MG
0 TABLET ORAL
Qty: 0 | Refills: 0 | DISCHARGE

## 2023-06-16 RX ORDER — FERROUS SULFATE 325(65) MG
325 TABLET ORAL THREE TIMES A DAY
Refills: 0 | Status: DISCONTINUED | OUTPATIENT
Start: 2023-06-16 | End: 2023-06-19

## 2023-06-16 RX ORDER — IBUPROFEN 200 MG
1 TABLET ORAL
Qty: 0 | Refills: 0 | DISCHARGE
Start: 2023-06-16

## 2023-06-16 RX ORDER — IBUPROFEN 200 MG
600 TABLET ORAL EVERY 6 HOURS
Refills: 0 | Status: DISCONTINUED | OUTPATIENT
Start: 2023-06-16 | End: 2023-06-19

## 2023-06-16 RX ORDER — ASCORBIC ACID 60 MG
500 TABLET,CHEWABLE ORAL DAILY
Refills: 0 | Status: DISCONTINUED | OUTPATIENT
Start: 2023-06-16 | End: 2023-06-19

## 2023-06-16 RX ORDER — NIFEDIPINE 30 MG
1 TABLET, EXTENDED RELEASE 24 HR ORAL
Qty: 0 | Refills: 0 | DISCHARGE
Start: 2023-06-16

## 2023-06-16 RX ORDER — ACETAMINOPHEN 500 MG
3 TABLET ORAL
Qty: 0 | Refills: 0 | DISCHARGE
Start: 2023-06-16

## 2023-06-16 RX ADMIN — SIMETHICONE 80 MILLIGRAM(S): 80 TABLET, CHEWABLE ORAL at 23:11

## 2023-06-16 RX ADMIN — Medication 225 MICROGRAM(S): at 05:31

## 2023-06-16 RX ADMIN — Medication 975 MILLIGRAM(S): at 02:21

## 2023-06-16 RX ADMIN — Medication 325 MILLIGRAM(S): at 14:23

## 2023-06-16 RX ADMIN — Medication 975 MILLIGRAM(S): at 08:14

## 2023-06-16 RX ADMIN — Medication 600 MILLIGRAM(S): at 18:15

## 2023-06-16 RX ADMIN — Medication 975 MILLIGRAM(S): at 15:14

## 2023-06-16 RX ADMIN — Medication 975 MILLIGRAM(S): at 02:51

## 2023-06-16 RX ADMIN — SIMETHICONE 80 MILLIGRAM(S): 80 TABLET, CHEWABLE ORAL at 06:17

## 2023-06-16 RX ADMIN — Medication 1 TABLET(S): at 08:14

## 2023-06-16 RX ADMIN — SIMETHICONE 80 MILLIGRAM(S): 80 TABLET, CHEWABLE ORAL at 14:27

## 2023-06-16 RX ADMIN — Medication 975 MILLIGRAM(S): at 23:55

## 2023-06-16 RX ADMIN — Medication 975 MILLIGRAM(S): at 14:23

## 2023-06-16 RX ADMIN — Medication 600 MILLIGRAM(S): at 05:32

## 2023-06-16 RX ADMIN — Medication 30 MILLIGRAM(S): at 08:14

## 2023-06-16 RX ADMIN — Medication 325 MILLIGRAM(S): at 23:10

## 2023-06-16 RX ADMIN — Medication 600 MILLIGRAM(S): at 06:02

## 2023-06-16 RX ADMIN — Medication 975 MILLIGRAM(S): at 09:00

## 2023-06-16 RX ADMIN — Medication 600 MILLIGRAM(S): at 17:35

## 2023-06-16 RX ADMIN — Medication 600 MILLIGRAM(S): at 13:00

## 2023-06-16 RX ADMIN — NALBUPHINE HYDROCHLORIDE 2.5 MILLIGRAM(S): 10 INJECTION, SOLUTION INTRAMUSCULAR; INTRAVENOUS; SUBCUTANEOUS at 03:05

## 2023-06-16 RX ADMIN — Medication 30 MILLIGRAM(S): at 00:22

## 2023-06-16 RX ADMIN — Medication 500 MILLIGRAM(S): at 12:17

## 2023-06-16 RX ADMIN — HEPARIN SODIUM 10000 UNIT(S): 5000 INJECTION INTRAVENOUS; SUBCUTANEOUS at 17:35

## 2023-06-16 RX ADMIN — Medication 600 MILLIGRAM(S): at 12:17

## 2023-06-16 RX ADMIN — Medication 975 MILLIGRAM(S): at 23:10

## 2023-06-16 RX ADMIN — NALBUPHINE HYDROCHLORIDE 2.5 MILLIGRAM(S): 10 INJECTION, SOLUTION INTRAMUSCULAR; INTRAVENOUS; SUBCUTANEOUS at 03:35

## 2023-06-16 NOTE — PROGRESS NOTE ADULT - ATTENDING COMMENTS
Associate Chief of L & D ( late entry)  I have met this patient for the first time today. [facility] by  and delivered by    OB Progress Note:  Delivery, POD#1  S: 31yo POD#1 s/p R-LTCS .  Patient denies any complaints at this time  O:   Vital Signs Last 24 Hrs  T(C): 36.7 (2023 10:31), Max: 36.7 (2023 08:11)  T(F): 98.1 (2023 10:31), Max: 98.1 (2023 08:11)  HR: 95 (2023 10:31) (81 - 105)  BP: 137/71 (2023 10:31) (114/76 - 137/71)  RR: 16 (2023 08:11) (16 - 20)  SpO2: 100% (2023 10:31) (100% - 100%)  Parameters below as of 2023 08:11  Patient On (Oxygen Delivery Method): room air  Labs:  Blood type: A Positive  Rubella IgG: RPR: Negative                    8.2<L>   8.43 >-----------< 213    (  @ 06:12 )             25.4<L>             10.9<L>   16.93<H> >-----------< 228    ( 06-15 @ 09:00 )             33.9<L>                  10.7<L>   9.00 >-----------< 216    (  @ 22:50 )             33.0<L>                   10.6<L>   10.26 >-----------< 197    (  @ 05:45 )             33.3<L>                        11.0<L>   10.11 >-----------< 217    (  @ 20:44 )             32.4<L>  - @ 06:12  135  |  102  |  6<L>  ----------------------------<  123<H>  3.8   |  22  |  0.64  06-15-23 @ 09:00  136  |  103  |  8   ----------------------------<  104<H>  4.4   |  19<L>  |  0.55  23 @ 22:50  134<L>  |  102  |  11  ----------------------------<  93  4.1   |  20<L>  |  0.70  23 @ 05:45  136  |  104  |  11  ----------------------------<  69<L>  4.0   |  21<L>  |  0.61  23 @ 20:44  136  |  101  |  8   ----------------------------<  81  4.3   |  23  |  0.70  Ca    6.8<L>      2023 06:12  Ca    7.9<L>      15 Rom 2023 09:00  Ca    8.5      2023 22:50  Ca    8.4      2023 05:45  Ca    9.1      2023 20:44  Mg     5.60<H>     06-16  Mg     5.50<H>     06-15  Mg     5.50<H>     06-15  Mg     4.30<H>     06-15  TPro  6.5  /  Alb  3.0<L>  /  TBili  <0.2  /  DBili  x   /  AST  25  /  ALT  10  /  AlkPhos  105  23 @ 06:12  TPro  6.5  /  Alb  3.0<L>  /  TBili  <0.2  /  DBili  x   /  AST  17  /  ALT  8   /  AlkPhos  128<H>  06-15-23 @ 09:00  TPro  6.3  /  Alb  3.0<L>  /  TBili  <0.2  /  DBili  x   /  AST  15  /  ALT  10  /  AlkPhos  128<H>  23 @ 22:50  TPro  6.4  /  Alb  3.0<L>  /  TBili  <0.2  /  DBili  x   /  AST  17  /  ALT  11  /  AlkPhos  127<H>  23 @ 05:45  TPro  6.8  /  Alb  3.4  /  TBili  <0.2  /  DBili  x   /  AST  18  /  ALT  15  /  AlkPhos  132<H>  23 @ 20:44  PE:  Abdomen: soft, mildly tender, with ecchymotic areas above and below the incision, Non distended  incision c/i.  Extremities: No erythema, no pitting edema  A/P: 31yo POD#1 s/p R-LTCS.at 35 3/7 weeks gestation for malpresentation and PEC with severe features also with ecchymosis on abd    - Continue regular diet.  - Increase ambulation.  - Continue Motrin, Tylenol, oxycodone PRN for pain control.  vs. continue PCEA for pain.  - F/u AM CBC  - Monitor BP closely, magnesium for 24 hours post delivery and Procardia 30 mg XL  Brandy Hardy M.D., M.B.A., M.S. Associate Chief of L & D ( late entry)  I have met this patient for the first time today. [facility] with severe features admitted by Dr. Metzger and delivered by Dr. Fraga    OB Progress Note:  Delivery, POD#1    S: 33yo POD#1 s/p R-LTCS .  Patient denies any complaints at this time    O:   Vital Signs Last 24 Hrs  T(C): 36.7 (2023 10:31), Max: 36.7 (2023 08:11)  T(F): 98.1 (2023 10:31), Max: 98.1 (2023 08:11)  HR: 95 (2023 10:31) (81 - 105)  BP: 137/71 (2023 10:31) (114/76 - 137/71)  RR: 16 (2023 08:11) (16 - 20)  SpO2: 100% (2023 10:31) (100% - 100%)  Parameters below as of 2023 08:11  Patient On (Oxygen Delivery Method): room air  Labs:  Blood type: A Positive  Rubella IgG: RPR: Negative                    8.2<L>   8.43 >-----------< 213    (  @ 06:12 )             25.4<L>             10.9<L>   16.93<H> >-----------< 228    ( 06-15 @ 09:00 )             33.9<L>                  10.7<L>   9.00 >-----------< 216    (  @ 22:50 )             33.0<L>                   10.6<L>   10.26 >-----------< 197    (  @ 05:45 )             33.3<L>                        11.0<L>   10.11 >-----------< 217    (  @ 20:44 )             32.4<L>  - @ 06:12  135  |  102  |  6<L>  ----------------------------<  123<H>  3.8   |  22  |  0.64  06-15-23 @ 09:00  136  |  103  |  8   ----------------------------<  104<H>  4.4   |  19<L>  |  0.55  23 @ 22:50  134<L>  |  102  |  11  ----------------------------<  93  4.1   |  20<L>  |  0.70  23 @ 05:45  136  |  104  |  11  ----------------------------<  69<L>  4.0   |  21<L>  |  0.61  23 @ 20:44  136  |  101  |  8   ----------------------------<  81  4.3   |  23  |  0.70  Ca    6.8<L>      2023 06:12  Ca    7.9<L>      15 2023 09:00  Ca    8.5      2023 22:50  Ca    8.4      2023 05:45  Ca    9.1      2023 20:44  Mg     5.60<H>     06-16  Mg     5.50<H>     06-15  Mg     5.50<H>     06-15  Mg     4.30<H>     06-15  TPro  6.5  /  Alb  3.0<L>  /  TBili  <0.2  /  DBili  x   /  AST  25  /  ALT  10  /  AlkPhos  105  23 @ 06:12  TPro  6.5  /  Alb  3.0<L>  /  TBili  <0.2  /  DBili  x   /  AST  17  /  ALT  8   /  AlkPhos  128<H>  06-15-23 @ 09:00  TPro  6.3  /  Alb  3.0<L>  /  TBili  <0.2  /  DBili  x   /  AST  15  /  ALT  10  /  AlkPhos  128<H>  23 @ 22:50  TPro  6.4  /  Alb  3.0<L>  /  TBili  <0.2  /  DBili  x   /  AST  17  /  ALT  11  /  AlkPhos  127<H>  23 @ 05:45  TPro  6.8  /  Alb  3.4  /  TBili  <0.2  /  DBili  x   /  AST  18  /  ALT  15  /  AlkPhos  132<H>  23 @ 20:44    PE:  Abdomen: soft, mildly tender, with ecchymotic areas above and below the incision, Non distended  incision c/i.  Extremities: No erythema, no pitting edema    A/P: 33yo POD#1 s/p R-LTCS.at 35 3/7 weeks gestation for malpresentation and PEC with severe features also with ecchymosis on abd    - Continue regular diet.  - Increase ambulation.  - Continue Motrin, Tylenol, oxycodone PRN for pain control.  vs. continue PCEA for pain.  - F/u AM CBC  - Monitor BP closely, magnesium for 24 hours post delivery and Procardia 30 mg XL  Brandy Hardy M.D., M.B.A., M.S.

## 2023-06-16 NOTE — PROGRESS NOTE ADULT - SUBJECTIVE AND OBJECTIVE BOX
Day 1 of Anesthesia Pain Management Service    SUBJECTIVE:  Pain Scale Score:          [X] Refer to charted pain scores    THERAPY:    s/p neuraxial PF morphine    MEDICATIONS  (STANDING):  acetaminophen     Tablet .. 975 milliGRAM(s) Oral <User Schedule>  ascorbic acid 500 milliGRAM(s) Oral daily  diphtheria/tetanus/pertussis (acellular) Vaccine (Adacel) 0.5 milliLiter(s) IntraMuscular once  ferrous    sulfate 325 milliGRAM(s) Oral three times a day  heparin   Injectable 62459 Unit(s) SubCutaneous every 12 hours  ibuprofen  Tablet. 600 milliGRAM(s) Oral every 6 hours  lactated ringers. 1000 milliLiter(s) (50 mL/Hr) IV Continuous <Continuous>  levothyroxine 225 MICROGram(s) Oral daily  NIFEdipine XL 30 milliGRAM(s) Oral every 24 hours  prenatal multivitamin 1 Tablet(s) Oral daily  sodium chloride 0.9%. 1000 milliLiter(s) (125 mL/Hr) IV Continuous <Continuous>    MEDICATIONS  (PRN):  dexAMETHasone  Injectable 4 milliGRAM(s) IV Push every 6 hours PRN Nausea  diphenhydrAMINE 25 milliGRAM(s) Oral every 6 hours PRN Pruritus  lanolin Ointment 1 Application(s) Topical every 6 hours PRN Sore Nipples  magnesium hydroxide Suspension 30 milliLiter(s) Oral two times a day PRN Constipation  naloxone Injectable 0.1 milliGRAM(s) IV Push every 3 minutes PRN For ANY of the following changes in patient status:  A. Breaths Per Minute LESS THAN 10, B. Oxygen saturation LESS THAN 90%, C. Sedation score of 6 for Stop After: 4 Times  ondansetron Injectable 4 milliGRAM(s) IV Push every 6 hours PRN Nausea  oxyCODONE    IR 5 milliGRAM(s) Oral every 3 hours PRN Moderate to Severe Pain (4-10)  oxyCODONE    IR 5 milliGRAM(s) Oral once PRN Moderate to Severe Pain (4-10)  simethicone 80 milliGRAM(s) Chew every 4 hours PRN Gas      OBJECTIVE:    Sedation:        	[X] Alert	[ ] Drowsy	[ ] Arousable      [ ] Asleep       [ ] Unresponsive    Side Effects:	[X] None	[ ] Nausea	[ ] Vomiting         [ ] Pruritus  		[ ] Weakness            [ ] Numbness	          [ ] Other:    Vital Signs Last 24 Hrs  T(C): 36.5 (16 Jun 2023 14:13), Max: 36.7 (16 Jun 2023 08:11)  T(F): 97.7 (16 Jun 2023 14:13), Max: 98.1 (16 Jun 2023 08:11)  HR: 69 (16 Jun 2023 14:13) (69 - 105)  BP: 135/77 (16 Jun 2023 14:13) (114/76 - 137/71)  BP(mean): --  RR: 16 (16 Jun 2023 08:11) (16 - 20)  SpO2: 100% (16 Jun 2023 14:13) (100% - 100%)    Parameters below as of 16 Jun 2023 08:11  Patient On (Oxygen Delivery Method): room air        ASSESSMENT/ PLAN  [X] Patient transitioned to prn analgesics  [X] Pain management per primary service, pain service to sign off   [X]Documentation and Verification of current medications

## 2023-06-16 NOTE — PROGRESS NOTE ADULT - SUBJECTIVE AND OBJECTIVE BOX
OB Progress Note:  Delivery, POD#1    S: 33yo POD#1 s/p unscheduled rLTCS . Her pain is well controlled. She is tolerating a regular diet and passing flatus. Denies N/V. Denies CP/SOB/lightheadedness/dizziness. She is ambulating without difficulty. Voiding spontaneously.     O:   Vital Signs Last 24 Hrs  T(C): 36.7 (2023 08:11), Max: 36.7 (2023 08:11)  T(F): 98.1 (2023 08:11), Max: 98.1 (2023 08:11)  HR: 98 (2023 08:11) (81 - 105)  BP: 126/77 (2023 08:11) (114/76 - 134/85)  RR: 16 (2023 08:11) (16 - 20)  SpO2: 100% (2023 08:11) (100% - 100%)    Parameters below as of 2023 08:11  Patient On (Oxygen Delivery Method): room air    Labs:  Blood type: A Positive  Rubella IgG: RPR: Negative                          8.2<L>   8.43 >-----------< 213    (  @ 06:12 )             25.4<L>                        10.9<L>   16.93<H> >-----------< 228    ( 06-15 @ 09:00 )             33.9<L>                        10.7<L>   9.00 >-----------< 216    (  @ 22:50 )             33.0<L>                        10.6<L>   10.26 >-----------< 197    (  @ 05:45 )             33.3<L>                        11.0<L>   10.11 >-----------< 217    (  @ 20:44 )             32.4<L>    - @ 06:12      135  |  102  |  6<L>  ----------------------------<  123<H>  3.8   |  22  |  0.64    06-15-23 @ 09:00      136  |  103  |  8   ----------------------------<  104<H>  4.4   |  19<L>  |  0.55    23 @ 22:50      134<L>  |  102  |  11  ----------------------------<  93  4.1   |  20<L>  |  0.70    23 @ 05:45      136  |  104  |  11  ----------------------------<  69<L>  4.0   |  21<L>  |  0.61    23 @ 20:44      136  |  101  |  8   ----------------------------<  81  4.3   |  23  |  0.70        Ca    6.8<L>      2023 06:12  Ca    7.9<L>      15 2023 09:00  Ca    8.5      2023 22:50  Ca    8.4      2023 05:45  Ca    9.1      2023 20:44  Mg     5.60<H>     06-16  Mg     5.50<H>     06-15  Mg     5.50<H>     06-15  Mg     4.30<H>     06-15    TPro  6.5  /  Alb  3.0<L>  /  TBili  <0.2  /  DBili  x   /  AST  25  /  ALT  10  /  AlkPhos  105  23 @ 06:12  TPro  6.5  /  Alb  3.0<L>  /  TBili  <0.2  /  DBili  x   /  AST  17  /  ALT  8   /  AlkPhos  128<H>  06-15-23 @ 09:00  TPro  6.3  /  Alb  3.0<L>  /  TBili  <0.2  /  DBili  x   /  AST  15  /  ALT  10  /  AlkPhos  128<H>  23 @ 22:50  TPro  6.4  /  Alb  3.0<L>  /  TBili  <0.2  /  DBili  x   /  AST  17  /  ALT  11  /  AlkPhos  127<H>  23 @ 05:45  TPro  6.8  /  Alb  3.4  /  TBili  <0.2  /  DBili  x   /  AST  18  /  ALT  15  /  AlkPhos  132<H>  23 @ 20:44    PE:  General: NAD  Abdomen: Mildly distended, appropriately tender, incision c/d/i.  Extremities: No erythema, no pitting edema

## 2023-06-16 NOTE — PROGRESS NOTE ADULT - SUBJECTIVE AND OBJECTIVE BOX
OB Progress Note:  Delivery, POD#1    S: 31yo POD#1 s/p unscheduled rLTCS after presenting to triage with severe range BPs, meeting criteria for sPEC. Her pain is well controlled. She is tolerating a regular diet and passing flatus. Denies N/V. Denies CP/SOB/lightheadedness/dizziness.   She is ambulating without difficulty. Voiding spontaneously.     O:   Vital Signs Last 24 Hrs  T(C): 36.7 (2023 10:31), Max: 36.7 (2023 08:11)  T(F): 98.1 (2023 10:31), Max: 98.1 (2023 08:11)  HR: 95 (2023 10:31) (81 - 105)  BP: 137/71 (2023 10:31) (114/76 - 137/71)  RR: 16 (2023 08:11) (16 - 20)  SpO2: 100% (2023 10:31) (100% - 100%)    Parameters below as of 2023 08:11  Patient On (Oxygen Delivery Method): room air    Labs:  Blood type: A Positive  Rubella IgG: RPR: Negative                          8.2<L>   8.43 >-----------< 213    (  @ 06:12 )             25.4<L>                        10.9<L>   16.93<H> >-----------< 228    ( 06-15 @ 09:00 )             33.9<L>                        10.7<L>   9.00 >-----------< 216    (  @ 22:50 )             33.0<L>                        10.6<L>   10.26 >-----------< 197    (  @ 05:45 )             33.3<L>                        11.0<L>   10.11 >-----------< 217    (  @ 20:44 )             32.4<L>    - @ 06:12      135  |  102  |  6<L>  ----------------------------<  123<H>  3.8   |  22  |  0.64    06-15-23 @ 09:00      136  |  103  |  8   ----------------------------<  104<H>  4.4   |  19<L>  |  0.55    23 @ 22:50      134<L>  |  102  |  11  ----------------------------<  93  4.1   |  20<L>  |  0.70    23 @ 05:45      136  |  104  |  11  ----------------------------<  69<L>  4.0   |  21<L>  |  0.61    23 @ 20:44      136  |  101  |  8   ----------------------------<  81  4.3   |  23  |  0.70        Ca    6.8<L>      2023 06:12  Ca    7.9<L>      15 Rom 2023 09:00  Ca    8.5      2023 22:50  Ca    8.4      2023 05:45  Ca    9.1      2023 20:44  Mg     5.60<H>     06-16  Mg     5.50<H>     06-15  Mg     5.50<H>     06-15  Mg     4.30<H>     15    TPro  6.5  /  Alb  3.0<L>  /  TBili  <0.2  /  DBili  x   /  AST  25  /  ALT  10  /  AlkPhos  105  23 @ 06:12  TPro  6.5  /  Alb  3.0<L>  /  TBili  <0.2  /  DBili  x   /  AST  17  /  ALT  8   /  AlkPhos  128<H>  06-15-23 @ 09:00  TPro  6.3  /  Alb  3.0<L>  /  TBili  <0.2  /  DBili  x   /  AST  15  /  ALT  10  /  AlkPhos  128<H>  23 @ 22:50  TPro  6.4  /  Alb  3.0<L>  /  TBili  <0.2  /  DBili  x   /  AST  17  /  ALT  11  /  AlkPhos  127<H>  23 @ 05:45  TPro  6.8  /  Alb  3.4  /  TBili  <0.2  /  DBili  x   /  AST  18  /  ALT  15  /  AlkPhos  132<H>  23 @ 20:44    PE:  General: NAD  Abdomen: Mildly distended, appropriately tender, incision c/d/i.  Extremities: No erythema, no pitting edema OB Progress Note:  Delivery, POD#1    S: 33yo POD#1 s/p unscheduled rLTCS after presenting to triage with severe range BPs, meeting criteria for sPEC. Her pain is well controlled. She is tolerating a regular diet and passing flatus. Denies N/V. Denies CP/SOB/lightheadedness/dizziness.   She is ambulating without difficulty. Voiding spontaneously.     O:   Vital Signs Last 24 Hrs  T(C): 36.7 (2023 10:31), Max: 36.7 (2023 08:11)  T(F): 98.1 (2023 10:31), Max: 98.1 (2023 08:11)  HR: 95 (2023 10:31) (81 - 105)  BP: 137/71 (2023 10:31) (114/76 - 137/71)  RR: 16 (2023 08:11) (16 - 20)  SpO2: 100% (2023 10:31) (100% - 100%)    Parameters below as of 2023 08:11  Patient On (Oxygen Delivery Method): room air    Labs:  Blood type: A Positive  Rubella IgG: RPR: Negative                          8.2<L>   8.43 >-----------< 213    (  @ 06:12 )             25.4<L>                        10.9<L>   16.93<H> >-----------< 228    ( 06-15 @ 09:00 )             33.9<L>                        10.7<L>   9.00 >-----------< 216    (  @ 22:50 )             33.0<L>                        10.6<L>   10.26 >-----------< 197    (  @ 05:45 )             33.3<L>                        11.0<L>   10.11 >-----------< 217    (  @ 20:44 )             32.4<L>    - @ 06:12      135  |  102  |  6<L>  ----------------------------<  123<H>  3.8   |  22  |  0.64    06-15-23 @ 09:00      136  |  103  |  8   ----------------------------<  104<H>  4.4   |  19<L>  |  0.55    23 @ 22:50      134<L>  |  102  |  11  ----------------------------<  93  4.1   |  20<L>  |  0.70    23 @ 05:45      136  |  104  |  11  ----------------------------<  69<L>  4.0   |  21<L>  |  0.61    23 @ 20:44      136  |  101  |  8   ----------------------------<  81  4.3   |  23  |  0.70        Ca    6.8<L>      2023 06:12  Ca    7.9<L>      15 Rom 2023 09:00  Ca    8.5      2023 22:50  Ca    8.4      2023 05:45  Ca    9.1      2023 20:44  Mg     5.60<H>     06-16  Mg     5.50<H>     06-15  Mg     5.50<H>     06-15  Mg     4.30<H>     15    TPro  6.5  /  Alb  3.0<L>  /  TBili  <0.2  /  DBili  x   /  AST  25  /  ALT  10  /  AlkPhos  105  23 @ 06:12  TPro  6.5  /  Alb  3.0<L>  /  TBili  <0.2  /  DBili  x   /  AST  17  /  ALT  8   /  AlkPhos  128<H>  06-15-23 @ 09:00  TPro  6.3  /  Alb  3.0<L>  /  TBili  <0.2  /  DBili  x   /  AST  15  /  ALT  10  /  AlkPhos  128<H>  23 @ 22:50  TPro  6.4  /  Alb  3.0<L>  /  TBili  <0.2  /  DBili  x   /  AST  17  /  ALT  11  /  AlkPhos  127<H>  23 @ 05:45  TPro  6.8  /  Alb  3.4  /  TBili  <0.2  /  DBili  x   /  AST  18  /  ALT  15  /  AlkPhos  132<H>  23 @ 20:44    PE:  General: NAD  Abdomen: Mildly distended, appropriately tender, incision with dried blood on left side of incision, intact with no active drainage  Extremities: No erythema, no pitting edema

## 2023-06-17 RX ADMIN — Medication 30 MILLIGRAM(S): at 08:40

## 2023-06-17 RX ADMIN — Medication 975 MILLIGRAM(S): at 21:30

## 2023-06-17 RX ADMIN — Medication 225 MICROGRAM(S): at 06:31

## 2023-06-17 RX ADMIN — SIMETHICONE 80 MILLIGRAM(S): 80 TABLET, CHEWABLE ORAL at 10:58

## 2023-06-17 RX ADMIN — Medication 600 MILLIGRAM(S): at 13:38

## 2023-06-17 RX ADMIN — Medication 1 TABLET(S): at 13:09

## 2023-06-17 RX ADMIN — HEPARIN SODIUM 10000 UNIT(S): 5000 INJECTION INTRAVENOUS; SUBCUTANEOUS at 19:20

## 2023-06-17 RX ADMIN — Medication 325 MILLIGRAM(S): at 13:08

## 2023-06-17 RX ADMIN — Medication 600 MILLIGRAM(S): at 01:51

## 2023-06-17 RX ADMIN — Medication 600 MILLIGRAM(S): at 13:08

## 2023-06-17 RX ADMIN — Medication 975 MILLIGRAM(S): at 11:27

## 2023-06-17 RX ADMIN — Medication 975 MILLIGRAM(S): at 21:03

## 2023-06-17 RX ADMIN — Medication 975 MILLIGRAM(S): at 16:28

## 2023-06-17 RX ADMIN — Medication 600 MILLIGRAM(S): at 01:21

## 2023-06-17 RX ADMIN — Medication 600 MILLIGRAM(S): at 06:32

## 2023-06-17 RX ADMIN — Medication 975 MILLIGRAM(S): at 10:57

## 2023-06-17 RX ADMIN — Medication 500 MILLIGRAM(S): at 13:08

## 2023-06-17 RX ADMIN — Medication 325 MILLIGRAM(S): at 06:32

## 2023-06-17 RX ADMIN — HEPARIN SODIUM 10000 UNIT(S): 5000 INJECTION INTRAVENOUS; SUBCUTANEOUS at 06:33

## 2023-06-17 RX ADMIN — SIMETHICONE 80 MILLIGRAM(S): 80 TABLET, CHEWABLE ORAL at 06:42

## 2023-06-17 RX ADMIN — Medication 975 MILLIGRAM(S): at 16:58

## 2023-06-17 NOTE — PROGRESS NOTE ADULT - ATTENDING COMMENTS
Patient evaluated at bedside, says she is feeling well. Patient has significant bruising around her incision and asking about that this morning, says it is mildly tender and warm to touch though pain is tolerable, denies any fevers/chills. Patient is tolerating PO, ambulating, and voiding. Denies toxic symptoms. Vitals reviewed: 111-129/67-82, HR 80-90. NAD, well appearing. Abdomen soft, appropriately tender, non-distended, fundus firm below umbilicus, ecchymosis at superior aspect of incision extending 4 cm superiorly and also over mons inferior to the incision and extending 2-3cm inferiorly, mildly tender, minimal induration. Lochia wnl. No calf tenderness b/l.    A/P: 31yo now POD2 s/p rLTCS c/b sPEC (BPs) s/p IV Mg and GDMA2. Blood pressures are well controlled on Procardia 30mg XL qd, denies toxic symptoms. Continue monitoring blood pressures and symptoms. Will continue to monitor ecchymosis surrounding c/s incision, anticipate spontaneous improvement/resolution. Routine postpartum care.     Lizzy SNYDER  Ob Service Attending

## 2023-06-17 NOTE — PROVIDER CONTACT NOTE (OTHER) - BACKGROUND
Patient s/p mag   c/s from 6/15 at 0506   Patient preclamptic w. severe features
C/S 06/15 @0506 . PEC w. severe features. S/P magnesium
C/S from 6/15 at 0506. Incision covered with dermabond

## 2023-06-17 NOTE — PROVIDER CONTACT NOTE (OTHER) - ACTION/TREATMENT ORDERED:
No new orders at this time, MD will assess pt at bedside
no new orders at this time, md stated pt will be rounded on shortly
No new orders necessary as per MD. Pt will be assessed at morning rounds. Continue with POC and current analgesic regimen. Writer will notify MD if patient status changes.

## 2023-06-17 NOTE — PROVIDER CONTACT NOTE (OTHER) - ASSESSMENT
Blood pressure 119/75  Hr 96  o2 100%  Respirations 17  Simethicone, hotpacks and incentive spiromoter given
Pt firm @ with moderate bleeding, scant serous incisional drainage from incision site.
Incision swollen with redness, and warmth to touch. Dried blood at site of incision

## 2023-06-17 NOTE — PROVIDER CONTACT NOTE (OTHER) - SITUATION
Pt complaint of increasing pain and burning at incision site. Pt verbalizes "it feels more inflamed than earlier"
Drainage from incisional site
Pt complaining of neck and shoulder pain

## 2023-06-17 NOTE — PROGRESS NOTE ADULT - SUBJECTIVE AND OBJECTIVE BOX
OB Progress Note: LTCS, POD#2    S: 33yo POD#2 s/p LTCS. Pain is well controlled. She is tolerating a regular diet and passing flatus. She is voiding spontaneously, and ambulating without difficulty. Denies CP/SOB. Denies lightheadedness/dizziness. Denies N/V.    O:  Vitals:  Vital Signs Last 24 Hrs  T(C): 36.6 (17 Jun 2023 05:50), Max: 37 (16 Jun 2023 22:00)  T(F): 97.9 (17 Jun 2023 05:50), Max: 98.6 (16 Jun 2023 22:00)  HR: 80 (17 Jun 2023 05:50) (69 - 95)  BP: 116/73 (17 Jun 2023 05:50) (111/67 - 137/71)  BP(mean): --  RR: 18 (17 Jun 2023 05:50) (18 - 18)  SpO2: 100% (17 Jun 2023 05:50) (96% - 100%)    Parameters below as of 16 Jun 2023 22:00  Patient On (Oxygen Delivery Method): room air        MEDICATIONS  (STANDING):  acetaminophen     Tablet .. 975 milliGRAM(s) Oral <User Schedule>  ascorbic acid 500 milliGRAM(s) Oral daily  diphtheria/tetanus/pertussis (acellular) Vaccine (Adacel) 0.5 milliLiter(s) IntraMuscular once  ferrous    sulfate 325 milliGRAM(s) Oral three times a day  heparin   Injectable 87053 Unit(s) SubCutaneous every 12 hours  ibuprofen  Tablet. 600 milliGRAM(s) Oral every 6 hours  lactated ringers. 1000 milliLiter(s) (50 mL/Hr) IV Continuous <Continuous>  levothyroxine 225 MICROGram(s) Oral daily  NIFEdipine XL 30 milliGRAM(s) Oral every 24 hours  prenatal multivitamin 1 Tablet(s) Oral daily  sodium chloride 0.9%. 1000 milliLiter(s) (125 mL/Hr) IV Continuous <Continuous>      MEDICATIONS  (PRN):  dexAMETHasone  Injectable 4 milliGRAM(s) IV Push every 6 hours PRN Nausea  diphenhydrAMINE 25 milliGRAM(s) Oral every 6 hours PRN Pruritus  lanolin Ointment 1 Application(s) Topical every 6 hours PRN Sore Nipples  magnesium hydroxide Suspension 30 milliLiter(s) Oral two times a day PRN Constipation  naloxone Injectable 0.1 milliGRAM(s) IV Push every 3 minutes PRN For ANY of the following changes in patient status:  A. Breaths Per Minute LESS THAN 10, B. Oxygen saturation LESS THAN 90%, C. Sedation score of 6 for Stop After: 4 Times  ondansetron Injectable 4 milliGRAM(s) IV Push every 6 hours PRN Nausea  oxyCODONE    IR 5 milliGRAM(s) Oral every 3 hours PRN Moderate to Severe Pain (4-10)  oxyCODONE    IR 5 milliGRAM(s) Oral once PRN Moderate to Severe Pain (4-10)  simethicone 80 milliGRAM(s) Chew every 4 hours PRN Gas      Labs:  Blood type: A Positive  Rubella IgG: RPR: Negative                          8.2<L>   8.43 >-----------< 213    ( 06-16 @ 06:12 )             25.4<L>                        10.9<L>   16.93<H> >-----------< 228    ( 06-15 @ 09:00 )             33.9<L>                        10.7<L>   9.00 >-----------< 216    ( 06-14 @ 22:50 )             33.0<L>    06-16-23 @ 06:12      135  |  102  |  6<L>  ----------------------------<  123<H>  3.8   |  22  |  0.64    06-15-23 @ 09:00      136  |  103  |  8   ----------------------------<  104<H>  4.4   |  19<L>  |  0.55    06-14-23 @ 22:50      134<L>  |  102  |  11  ----------------------------<  93  4.1   |  20<L>  |  0.70        Ca    6.8<L>      16 Jun 2023 06:12  Ca    7.9<L>      15 Rom 2023 09:00  Ca    8.5      14 Jun 2023 22:50  Mg     5.60<H>     06-16  Mg     5.50<H>     06-15  Mg     5.50<H>     06-15  Mg     4.30<H>     06-15    TPro  6.5  /  Alb  3.0<L>  /  TBili  <0.2  /  DBili  x   /  AST  25  /  ALT  10  /  AlkPhos  105  06-16-23 @ 06:12  TPro  6.5  /  Alb  3.0<L>  /  TBili  <0.2  /  DBili  x   /  AST  17  /  ALT  8   /  AlkPhos  128<H>  06-15-23 @ 09:00  TPro  6.3  /  Alb  3.0<L>  /  TBili  <0.2  /  DBili  x   /  AST  15  /  ALT  10  /  AlkPhos  128<H>  06-14-23 @ 22:50          PE:  General: NAD  Abdomen: Soft, appropriately tender, incision c/d/i. Pt with ecchymosis around incision and superior mons, non indurated, soft, with rubor and calor   Extremities: No erythema, no pitting edema

## 2023-06-18 LAB
HCT VFR BLD CALC: 26.5 % — LOW (ref 34.5–45)
HGB BLD-MCNC: 8.3 G/DL — LOW (ref 11.5–15.5)
MCHC RBC-ENTMCNC: 26.7 PG — LOW (ref 27–34)
MCHC RBC-ENTMCNC: 31.3 GM/DL — LOW (ref 32–36)
MCV RBC AUTO: 85.2 FL — SIGNIFICANT CHANGE UP (ref 80–100)
NRBC # BLD: 0 /100 WBCS — SIGNIFICANT CHANGE UP (ref 0–0)
NRBC # FLD: 0.03 K/UL — HIGH (ref 0–0)
PLATELET # BLD AUTO: 284 K/UL — SIGNIFICANT CHANGE UP (ref 150–400)
RBC # BLD: 3.11 M/UL — LOW (ref 3.8–5.2)
RBC # FLD: 14.6 % — HIGH (ref 10.3–14.5)
WBC # BLD: 11.94 K/UL — HIGH (ref 3.8–10.5)
WBC # FLD AUTO: 11.94 K/UL — HIGH (ref 3.8–10.5)

## 2023-06-18 RX ORDER — ASCORBIC ACID 60 MG
1 TABLET,CHEWABLE ORAL
Qty: 0 | Refills: 0 | DISCHARGE
Start: 2023-06-18

## 2023-06-18 RX ORDER — NIFEDIPINE 30 MG
1 TABLET, EXTENDED RELEASE 24 HR ORAL
Qty: 30 | Refills: 0
Start: 2023-06-18 | End: 2023-07-17

## 2023-06-18 RX ORDER — FERROUS SULFATE 325(65) MG
1 TABLET ORAL
Qty: 60 | Refills: 0
Start: 2023-06-18 | End: 2023-08-16

## 2023-06-18 RX ADMIN — Medication 600 MILLIGRAM(S): at 13:27

## 2023-06-18 RX ADMIN — Medication 325 MILLIGRAM(S): at 19:56

## 2023-06-18 RX ADMIN — Medication 975 MILLIGRAM(S): at 08:50

## 2023-06-18 RX ADMIN — Medication 225 MICROGRAM(S): at 06:52

## 2023-06-18 RX ADMIN — Medication 500 MILLIGRAM(S): at 19:56

## 2023-06-18 RX ADMIN — Medication 1 TABLET(S): at 12:57

## 2023-06-18 RX ADMIN — Medication 600 MILLIGRAM(S): at 00:31

## 2023-06-18 RX ADMIN — Medication 600 MILLIGRAM(S): at 06:52

## 2023-06-18 RX ADMIN — Medication 325 MILLIGRAM(S): at 00:31

## 2023-06-18 RX ADMIN — HEPARIN SODIUM 10000 UNIT(S): 5000 INJECTION INTRAVENOUS; SUBCUTANEOUS at 06:52

## 2023-06-18 RX ADMIN — Medication 975 MILLIGRAM(S): at 08:20

## 2023-06-18 RX ADMIN — Medication 600 MILLIGRAM(S): at 01:02

## 2023-06-18 RX ADMIN — Medication 600 MILLIGRAM(S): at 12:57

## 2023-06-18 RX ADMIN — HEPARIN SODIUM 10000 UNIT(S): 5000 INJECTION INTRAVENOUS; SUBCUTANEOUS at 19:55

## 2023-06-18 RX ADMIN — Medication 30 MILLIGRAM(S): at 08:10

## 2023-06-18 RX ADMIN — Medication 325 MILLIGRAM(S): at 06:52

## 2023-06-18 RX ADMIN — Medication 600 MILLIGRAM(S): at 19:56

## 2023-06-18 RX ADMIN — Medication 600 MILLIGRAM(S): at 20:26

## 2023-06-18 NOTE — PROGRESS NOTE ADULT - ATTENDING COMMENTS
Patient evaluated at bedside, says she is feeling well. She says the abdominal bruising is darker today and warm though continues to remain non-tender. Pain otherwise well controlled and she is ambulating, voiding, tolerating PO, and passing flatus. Denies toxic symptoms. Vitals reviewed: -124/76-89, HR 88-99.  NAD, well appearing. Abdomen soft, appropriately tender, non-distended, fundus firm below umbilicus, ecchymosis at superior aspect of incision extending 4 cm superiorly and also over mons inferior to the incision and extending 2-3cm inferiorly, mildly tender, has not expanded compared to yesterday though is darker throughout, minimal induration. Lochia wnl. No calf tenderness b/l.    A/P: 33yo now POD3 s/p rLTCS c/b sPEC (BPs) s/p IV Mg and GDMA2. Blood pressures are well controlled on Procardia 30mg XL qd, denies toxic symptoms. Continue monitoring blood pressures and symptoms. Will continue to monitor ecchymosis surrounding c/s incision, anticipate spontaneous improvement/resolution. Repeat CBC done today with stable H/H 8.3/26.5 (from 8.2/25.4). Routine postpartum care. Anticipate d/c home 6/19.    Lizzy SNYDER  Ob Service Attending

## 2023-06-18 NOTE — PROGRESS NOTE ADULT - SUBJECTIVE AND OBJECTIVE BOX
OB Postpartum Note:  Delivery, POD#3    S: 33yo POD#3 s/p LTCS. The patient feels well.  Pain is well controlled. She is tolerating a regular diet and passing flatus. She is voiding spontaneously, and ambulating without difficulty. Denies CP/SOB. Denies lightheadedness/dizziness. Denies N/V.    O:  Vitals:  Vital Signs Last 24 Hrs  T(C): 36.4 (2023 22:42), Max: 36.8 (2023 11:18)  T(F): 97.5 (2023 22:42), Max: 98.3 (2023 18:53)  HR: 87 (2023 22:42) (87 - 97)  BP: 130/84 (2023 22:42) (126/77 - 130/84)  BP(mean): --  RR: 17 (2023 22:42) (17 - 18)  SpO2: 99% (2023 22:42) (99% - 100%)    Parameters below as of 2023 22:42  Patient On (Oxygen Delivery Method): room air        MEDICATIONS  (STANDING):  acetaminophen     Tablet .. 975 milliGRAM(s) Oral <User Schedule>  ascorbic acid 500 milliGRAM(s) Oral daily  diphtheria/tetanus/pertussis (acellular) Vaccine (Adacel) 0.5 milliLiter(s) IntraMuscular once  ferrous    sulfate 325 milliGRAM(s) Oral three times a day  heparin   Injectable 23153 Unit(s) SubCutaneous every 12 hours  ibuprofen  Tablet. 600 milliGRAM(s) Oral every 6 hours  lactated ringers. 1000 milliLiter(s) (50 mL/Hr) IV Continuous <Continuous>  levothyroxine 225 MICROGram(s) Oral daily  NIFEdipine XL 30 milliGRAM(s) Oral every 24 hours  prenatal multivitamin 1 Tablet(s) Oral daily  sodium chloride 0.9%. 1000 milliLiter(s) (125 mL/Hr) IV Continuous <Continuous>    MEDICATIONS  (PRN):  dexAMETHasone  Injectable 4 milliGRAM(s) IV Push every 6 hours PRN Nausea  diphenhydrAMINE 25 milliGRAM(s) Oral every 6 hours PRN Pruritus  lanolin Ointment 1 Application(s) Topical every 6 hours PRN Sore Nipples  magnesium hydroxide Suspension 30 milliLiter(s) Oral two times a day PRN Constipation  naloxone Injectable 0.1 milliGRAM(s) IV Push every 3 minutes PRN For ANY of the following changes in patient status:  A. Breaths Per Minute LESS THAN 10, B. Oxygen saturation LESS THAN 90%, C. Sedation score of 6 for Stop After: 4 Times  ondansetron Injectable 4 milliGRAM(s) IV Push every 6 hours PRN Nausea  oxyCODONE    IR 5 milliGRAM(s) Oral every 3 hours PRN Moderate to Severe Pain (4-10)  oxyCODONE    IR 5 milliGRAM(s) Oral once PRN Moderate to Severe Pain (4-10)  simethicone 80 milliGRAM(s) Chew every 4 hours PRN Gas      LABS:  Blood type: A Positive  Rubella IgG: RPR: Negative                          8.2<L>   8.43 >-----------< 213    (  @ 06:12 )             25.4<L>                        10.9<L>   16.93<H> >-----------< 228    ( 06-15 @ 09:00 )             33.9<L>    23 @ 06:12      135  |  102  |  6<L>  ----------------------------<  123<H>  3.8   |  22  |  0.64    06-15-23 @ 09:00      136  |  103  |  8   ----------------------------<  104<H>  4.4   |  19<L>  |  0.55        Ca    6.8<L>      2023 06:12  Ca    7.9<L>      15 Rom 2023 09:00  Mg     5.60<H>     -16  Mg     5.50<H>     -15  Mg     5.50<H>     06-15  Mg     4.30<H>     06-15    TPro  6.5  /  Alb  3.0<L>  /  TBili  <0.2  /  DBili  x   /  AST  25  /  ALT  10  /  AlkPhos  105  23 @ 06:12  TPro  6.5  /  Alb  3.0<L>  /  TBili  <0.2  /  DBili  x   /  AST  17  /  ALT  8   /  AlkPhos  128<H>  06-15-23 @ 09:00          Physical exam:  General: NAD  Abdomen: Soft, appropriately tender, incision c/d/i. Pt with ecchymosis around incision and superior mons, mildly tender, less soft since yesterday's exam and darker with rubor and calor   Extremities: No erythema, no pitting edema

## 2023-06-19 VITALS
SYSTOLIC BLOOD PRESSURE: 137 MMHG | HEART RATE: 100 BPM | TEMPERATURE: 98 F | OXYGEN SATURATION: 100 % | DIASTOLIC BLOOD PRESSURE: 83 MMHG | RESPIRATION RATE: 18 BRPM

## 2023-06-19 LAB
ANISOCYTOSIS BLD QL: SLIGHT — SIGNIFICANT CHANGE UP
APTT BLD: 32 SEC — SIGNIFICANT CHANGE UP (ref 27–36.3)
BASOPHILS # BLD AUTO: 0 K/UL — SIGNIFICANT CHANGE UP (ref 0–0.2)
BASOPHILS NFR BLD AUTO: 0 % — SIGNIFICANT CHANGE UP (ref 0–2)
EOSINOPHIL # BLD AUTO: 0.19 K/UL — SIGNIFICANT CHANGE UP (ref 0–0.5)
EOSINOPHIL NFR BLD AUTO: 1.8 % — SIGNIFICANT CHANGE UP (ref 0–6)
FIBRINOGEN PPP-MCNC: 572 MG/DL — HIGH (ref 200–465)
GIANT PLATELETS BLD QL SMEAR: PRESENT — SIGNIFICANT CHANGE UP
HCT VFR BLD CALC: 26.1 % — LOW (ref 34.5–45)
HGB BLD-MCNC: 8.2 G/DL — LOW (ref 11.5–15.5)
IANC: 6.73 K/UL — SIGNIFICANT CHANGE UP (ref 1.8–7.4)
INR BLD: 0.95 RATIO — SIGNIFICANT CHANGE UP (ref 0.88–1.16)
LYMPHOCYTES # BLD AUTO: 1.29 K/UL — SIGNIFICANT CHANGE UP (ref 1–3.3)
LYMPHOCYTES # BLD AUTO: 12.4 % — LOW (ref 13–44)
MACROCYTES BLD QL: SLIGHT — SIGNIFICANT CHANGE UP
MANUAL SMEAR VERIFICATION: SIGNIFICANT CHANGE UP
MCHC RBC-ENTMCNC: 27.2 PG — SIGNIFICANT CHANGE UP (ref 27–34)
MCHC RBC-ENTMCNC: 31.4 GM/DL — LOW (ref 32–36)
MCV RBC AUTO: 86.7 FL — SIGNIFICANT CHANGE UP (ref 80–100)
MONOCYTES # BLD AUTO: 0.36 K/UL — SIGNIFICANT CHANGE UP (ref 0–0.9)
MONOCYTES NFR BLD AUTO: 3.5 % — SIGNIFICANT CHANGE UP (ref 2–14)
MYELOCYTES NFR BLD: 0.9 % — HIGH (ref 0–0)
NEUTROPHILS # BLD AUTO: 8.47 K/UL — HIGH (ref 1.8–7.4)
NEUTROPHILS NFR BLD AUTO: 80.5 % — HIGH (ref 43–77)
NEUTS BAND # BLD: 0.9 % — SIGNIFICANT CHANGE UP (ref 0–6)
NRBC # BLD: 1 /100 — HIGH (ref 0–0)
PLAT MORPH BLD: NORMAL — SIGNIFICANT CHANGE UP
PLATELET # BLD AUTO: 298 K/UL — SIGNIFICANT CHANGE UP (ref 150–400)
PLATELET COUNT - ESTIMATE: NORMAL — SIGNIFICANT CHANGE UP
POIKILOCYTOSIS BLD QL AUTO: SLIGHT — SIGNIFICANT CHANGE UP
POLYCHROMASIA BLD QL SMEAR: SLIGHT — SIGNIFICANT CHANGE UP
PROTHROM AB SERPL-ACNC: 11 SEC — SIGNIFICANT CHANGE UP (ref 10.5–13.4)
RBC # BLD: 3.01 M/UL — LOW (ref 3.8–5.2)
RBC # FLD: 14.7 % — HIGH (ref 10.3–14.5)
RBC BLD AUTO: ABNORMAL
WBC # BLD: 10.4 K/UL — SIGNIFICANT CHANGE UP (ref 3.8–10.5)
WBC # FLD AUTO: 10.4 K/UL — SIGNIFICANT CHANGE UP (ref 3.8–10.5)

## 2023-06-19 RX ORDER — LEVOTHYROXINE SODIUM 125 MCG
1 TABLET ORAL
Qty: 0 | Refills: 0 | DISCHARGE

## 2023-06-19 RX ORDER — NIFEDIPINE 30 MG
1 TABLET, EXTENDED RELEASE 24 HR ORAL
Qty: 30 | Refills: 0
Start: 2023-06-19 | End: 2023-07-18

## 2023-06-19 RX ORDER — NIFEDIPINE 30 MG
1 TABLET, EXTENDED RELEASE 24 HR ORAL
Qty: 30 | Refills: 0
Start: 2023-06-19

## 2023-06-19 RX ADMIN — Medication 1 TABLET(S): at 11:55

## 2023-06-19 RX ADMIN — Medication 975 MILLIGRAM(S): at 09:00

## 2023-06-19 RX ADMIN — Medication 600 MILLIGRAM(S): at 01:01

## 2023-06-19 RX ADMIN — Medication 500 MILLIGRAM(S): at 11:55

## 2023-06-19 RX ADMIN — Medication 975 MILLIGRAM(S): at 08:02

## 2023-06-19 RX ADMIN — Medication 600 MILLIGRAM(S): at 11:55

## 2023-06-19 RX ADMIN — Medication 30 MILLIGRAM(S): at 08:03

## 2023-06-19 RX ADMIN — Medication 225 MICROGRAM(S): at 06:08

## 2023-06-19 RX ADMIN — Medication 600 MILLIGRAM(S): at 06:09

## 2023-06-19 RX ADMIN — HEPARIN SODIUM 10000 UNIT(S): 5000 INJECTION INTRAVENOUS; SUBCUTANEOUS at 08:03

## 2023-06-19 RX ADMIN — Medication 600 MILLIGRAM(S): at 12:45

## 2023-06-19 RX ADMIN — Medication 600 MILLIGRAM(S): at 01:35

## 2023-06-19 RX ADMIN — Medication 600 MILLIGRAM(S): at 06:40

## 2023-06-19 NOTE — PROGRESS NOTE ADULT - SUBJECTIVE AND OBJECTIVE BOX
OB Progress Note:  Delivery, POD#4    S: 31yo POD#4 s/p rLTCS for Breech presentation @35 weeks 3 days. Patient also met criteria for severe preeclampsia by headache and blood pressure.  Her pain is well controlled, however patient does have increase pain on abdomen in the area of ecchymmosis and induration. She is tolerating a regular diet and passing flatus. Denies N/V. Denies CP/SOB/lightheadedness/dizziness. Endorses light vaginal bleeding, less than one pad per hour. She is ambulating without difficulty. Voiding spontaneously.     O:   Vital Signs Last 24 Hrs  T(C): 36.7 (2023 06:14), Max: 36.8 (2023 10:33)  T(F): 98 (2023 06:14), Max: 98.3 (2023 10:33)  HR: 93 (2023 06:14) (88 - 100)  BP: 101/72 (2023 06:14) (101/72 - 131/80)  BP(mean): --  RR: 18 (2023 06:14) (18 - 19)  SpO2: 100% (2023 06:14) (99% - 100%)    Parameters below as of 2023 21:15  Patient On (Oxygen Delivery Method): room air        Labs:  Blood type: A Positive  Rubella IgG: RPR: Negative                          8.3<L>   11.94<H> >-----------< 284    ( 18 @ 15:18 )             26.5<L>                  PE:  General: NAD  Heart: extremities well-perfused  Lungs: breathing comfortably  Abdomen: Mildly distended, appropriately tender, firm fundus, incision c/d/i Large area of ecchymosis spanding the lower abdomen and the mons. Area of induration above incision  Extremities: No erythema, no pitting edema

## 2023-06-19 NOTE — PROGRESS NOTE ADULT - ASSESSMENT
A/P: 31yo POD#1 s/p LTCS.  Patient is stable and doing well post-operatively.    - Continue regular diet.  - Increase ambulation.  - HSQ, venodynes for DVT prophylaxis  - Continue motrin, tylenol, oxycodone PRN for pain control.  vs. continue PCEA for pain.  - F/u AM CBC    Vasyl Floyd, PGY1
32 y.o.  @35w2d presenting with HA, elevated BPs, urticaria. Due to severe range BPs and several borderline, patient kept NPO as she would require delivery with one more severe range BPs. No acute complaints, reports HA now resolved. Fetal status reassuring.    #gHTN  - AM HELLP labs  - Monitor BPs  - 24 hour urine protein    #GDMA2   - Monitor FS   - c/w once on diet    #Fetal wellbeing  - Cont monitoring, transition to NST once clinically stable  - NICU consult prn    #Maternal wellbeing  - NPO  - SCDs for DVT ppx  - PNV  - c/w home Synthroid    Gisela Wyatt PGY-3
33yo POD#4 s/p unscheduled rLTCS after presenting to triage with severe range BPs, meeting criteria for sPEC.  Patient is stable and doing well post-operatively.      #sPEC (BP)  - s/p Mg 6/16  - Continue Procardia 30XL daily  - S/p Pro 10/10 (6/15)  - CTM BPs closely, blood pressures overnight 100-130/70-80      #PP care  - Continue regular diet.  - Increase ambulation.  -Continue to monitor ecchymosis for expansion increased pain, concerning for expanding hematoma   - HSQ, venodynes for DVT prophylaxis  - Continue motrin, tylenol, oxycodone PRN for pain control      Gregoria Lawton, PGY3 
A/P: 31yo POD#1 s/p unscheduled rLTCS after presenting to triage with severe range BPs, meeting criteria for sPEC.  Patient is stable and doing well post-operatively.      #sPEC (BP)  - Continue Mg for seizure pp until 24hr PP  - Continue Procardia 30XL daily  - S/p Pro 10/10 (6/15)  - CTM BPs closely    #PP care  - Continue regular diet.  - Increase ambulation.  - HSQ, venodynes for DVT prophylaxis  - Continue motrin, tylenol, oxycodone PRN for pain control  - hct 33.9->25.4, VSS, asymptomatic, ordered for Vit c, iron    Vasyl Floyd, PGY1
A/P: 31yo POD#2 s/p unscheduled rLTCS after presenting to triage with severe range BPs, meeting criteria for sPEC.  Patient is stable and doing well post-operatively.      #sPEC (BP)  - s/p Mg 6/16  - Continue Procardia 30XL daily  - S/p Pro 10/10 (6/15)  - CTM BPs closely    #PP care  - Continue regular diet.  - Increase ambulation.  - HSQ, venodynes for DVT prophylaxis  - Continue motrin, tylenol, oxycodone PRN for pain control  - hct 33.9->25.4, VSS, asymptomatic, ordered for Vit c, iron    DTaveras PGY 1  
A/P: 33yo POD#3 s/p unscheduled rLTCS after presenting to triage with severe range BPs, meeting criteria for sPEC.  Patient is stable and doing well post-operatively.      #sPEC (BP)  - s/p Mg 6/16  - Continue Procardia 30XL daily  - S/p Pro 10/10 (6/15)  - CTM BPs closely    #PP care  - Continue regular diet.  - Increase ambulation.  - HSQ, venodynes for DVT prophylaxis  - Continue motrin, tylenol, oxycodone PRN for pain control  - hct 33.9->25.4, VSS, asymptomatic, ordered for Vit c, iron    DTaveras PGY 1

## 2023-06-19 NOTE — PROGRESS NOTE ADULT - ATTENDING COMMENTS
Service attending    I personally saw and examined patient today    VSS    Patient without complaints-- states would not know area of eccymosis was there if wasn't told- unsure if slight bigger today- nonpainful    abd soft NT large area of eccymosis on mons and around abdomen expanding onto abdomen- soft    a/p eccymotic incision  H/H stable yesterday  will recheck CBC again - if stable will discharge home   plan discussed with patient    Jose SNYDER

## 2023-06-20 ENCOUNTER — NON-APPOINTMENT (OUTPATIENT)
Age: 33
End: 2023-06-20

## 2023-06-20 DIAGNOSIS — O24.419 GESTATIONAL DIABETES MELLITUS IN PREGNANCY, UNSPECIFIED CONTROL: ICD-10-CM

## 2023-06-20 DIAGNOSIS — O14.90 UNSPECIFIED PRE-ECLAMPSIA, UNSPECIFIED TRIMESTER: ICD-10-CM

## 2023-06-20 LAB
BILE AC SERPL-SCNC: 1.7 UMOL/L — SIGNIFICANT CHANGE UP
CDCAE SER-MCNC: 0.7 UMOL/L — SIGNIFICANT CHANGE UP
CHOLATE SER-MCNC: 0.4 UMOL/L — SIGNIFICANT CHANGE UP
DO-CHOLATE SER-MCNC: 0.5 UMOL/L — SIGNIFICANT CHANGE UP
URSODEOXYCHOLATE SERPL-SCNC: 0.1 UMOL/L — SIGNIFICANT CHANGE UP

## 2023-06-20 RX ORDER — PRENATAL VIT NO.126/IRON/FOLIC 28MG-0.8MG
28-0.8 TABLET ORAL DAILY
Refills: 0 | Status: ACTIVE | COMMUNITY
Start: 2023-06-20

## 2023-06-20 RX ORDER — NIFEDIPINE 30 MG/1
30 TABLET, FILM COATED, EXTENDED RELEASE ORAL DAILY
Refills: 0 | Status: ACTIVE | COMMUNITY
Start: 2023-06-20

## 2023-06-20 RX ORDER — LEVOTHYROXINE SODIUM 0.03 MG/1
25 TABLET ORAL DAILY
Refills: 0 | Status: ACTIVE | COMMUNITY
Start: 2023-06-20

## 2023-06-20 RX ORDER — LEVOTHYROXINE SODIUM 0.2 MG/1
200 TABLET ORAL DAILY
Refills: 0 | Status: ACTIVE | COMMUNITY
Start: 2023-06-20

## 2023-06-22 ENCOUNTER — NON-APPOINTMENT (OUTPATIENT)
Age: 33
End: 2023-06-22

## 2023-06-28 ENCOUNTER — NON-APPOINTMENT (OUTPATIENT)
Age: 33
End: 2023-06-28

## 2023-06-29 LAB — SURGICAL PATHOLOGY STUDY: SIGNIFICANT CHANGE UP

## 2023-07-05 ENCOUNTER — NON-APPOINTMENT (OUTPATIENT)
Age: 33
End: 2023-07-05

## 2023-07-14 ENCOUNTER — NON-APPOINTMENT (OUTPATIENT)
Age: 33
End: 2023-07-14

## 2023-09-19 ENCOUNTER — APPOINTMENT (OUTPATIENT)
Dept: CARDIOLOGY | Facility: CLINIC | Age: 33
End: 2023-09-19

## 2024-02-22 NOTE — ED ADULT TRIAGE NOTE - PAIN: PRESENCE, MLM
complains of pain/discomfort

## 2024-04-27 ENCOUNTER — APPOINTMENT (OUTPATIENT)
Dept: ANTEPARTUM | Facility: CLINIC | Age: 34
End: 2024-04-27

## 2024-04-27 ENCOUNTER — EMERGENCY (EMERGENCY)
Facility: HOSPITAL | Age: 34
LOS: 1 days | Discharge: STILL ADMITTED OR ADMINISTRATIVE | End: 2024-04-27
Admitting: EMERGENCY MEDICINE
Payer: MEDICAID

## 2024-04-27 ENCOUNTER — OUTPATIENT (OUTPATIENT)
Dept: OUTPATIENT SERVICES | Facility: HOSPITAL | Age: 34
LOS: 1 days | Discharge: HOME | End: 2024-04-27
Payer: MEDICAID

## 2024-04-27 VITALS
TEMPERATURE: 98 F | HEART RATE: 111 BPM | SYSTOLIC BLOOD PRESSURE: 148 MMHG | DIASTOLIC BLOOD PRESSURE: 89 MMHG | OXYGEN SATURATION: 100 % | RESPIRATION RATE: 16 BRPM

## 2024-04-27 VITALS — SYSTOLIC BLOOD PRESSURE: 136 MMHG | DIASTOLIC BLOOD PRESSURE: 72 MMHG | HEART RATE: 90 BPM

## 2024-04-27 VITALS
HEART RATE: 100 BPM | RESPIRATION RATE: 18 BRPM | TEMPERATURE: 98 F | SYSTOLIC BLOOD PRESSURE: 126 MMHG | DIASTOLIC BLOOD PRESSURE: 90 MMHG

## 2024-04-27 DIAGNOSIS — O26.899 OTHER SPECIFIED PREGNANCY RELATED CONDITIONS, UNSPECIFIED TRIMESTER: ICD-10-CM

## 2024-04-27 DIAGNOSIS — Z90.49 ACQUIRED ABSENCE OF OTHER SPECIFIED PARTS OF DIGESTIVE TRACT: Chronic | ICD-10-CM

## 2024-04-27 DIAGNOSIS — Z98.891 HISTORY OF UTERINE SCAR FROM PREVIOUS SURGERY: Chronic | ICD-10-CM

## 2024-04-27 LAB
ALBUMIN SERPL ELPH-MCNC: 3.6 G/DL — SIGNIFICANT CHANGE UP (ref 3.3–5)
ALP SERPL-CCNC: 120 U/L — SIGNIFICANT CHANGE UP (ref 40–120)
ALT FLD-CCNC: 15 U/L — SIGNIFICANT CHANGE UP (ref 4–33)
ANION GAP SERPL CALC-SCNC: 12 MMOL/L — SIGNIFICANT CHANGE UP (ref 7–14)
APPEARANCE UR: ABNORMAL
AST SERPL-CCNC: 22 U/L — SIGNIFICANT CHANGE UP (ref 4–32)
BACTERIA # UR AUTO: ABNORMAL /HPF
BASOPHILS # BLD AUTO: 0.03 K/UL — SIGNIFICANT CHANGE UP (ref 0–0.2)
BASOPHILS NFR BLD AUTO: 0.4 % — SIGNIFICANT CHANGE UP (ref 0–2)
BILIRUB SERPL-MCNC: <0.2 MG/DL — SIGNIFICANT CHANGE UP (ref 0.2–1.2)
BILIRUB UR-MCNC: NEGATIVE — SIGNIFICANT CHANGE UP
BUN SERPL-MCNC: 8 MG/DL — SIGNIFICANT CHANGE UP (ref 7–23)
CALCIUM SERPL-MCNC: 8.8 MG/DL — SIGNIFICANT CHANGE UP (ref 8.4–10.5)
CAST: 1 /LPF — SIGNIFICANT CHANGE UP (ref 0–4)
CHLORIDE SERPL-SCNC: 101 MMOL/L — SIGNIFICANT CHANGE UP (ref 98–107)
CO2 SERPL-SCNC: 22 MMOL/L — SIGNIFICANT CHANGE UP (ref 22–31)
COLOR SPEC: YELLOW — SIGNIFICANT CHANGE UP
CREAT ?TM UR-MCNC: 75 MG/DL — SIGNIFICANT CHANGE UP
CREAT SERPL-MCNC: 0.56 MG/DL — SIGNIFICANT CHANGE UP (ref 0.5–1.3)
DIFF PNL FLD: NEGATIVE — SIGNIFICANT CHANGE UP
EGFR: 124 ML/MIN/1.73M2 — SIGNIFICANT CHANGE UP
EOSINOPHIL # BLD AUTO: 0.12 K/UL — SIGNIFICANT CHANGE UP (ref 0–0.5)
EOSINOPHIL NFR BLD AUTO: 1.4 % — SIGNIFICANT CHANGE UP (ref 0–6)
GLUCOSE BLDC GLUCOMTR-MCNC: 119 MG/DL — HIGH (ref 70–99)
GLUCOSE BLDC GLUCOMTR-MCNC: 138 MG/DL — HIGH (ref 70–99)
GLUCOSE SERPL-MCNC: 131 MG/DL — HIGH (ref 70–99)
GLUCOSE UR QL: NEGATIVE MG/DL — SIGNIFICANT CHANGE UP
HCT VFR BLD CALC: 34.4 % — LOW (ref 34.5–45)
HGB BLD-MCNC: 11.2 G/DL — LOW (ref 11.5–15.5)
IANC: 6.09 K/UL — SIGNIFICANT CHANGE UP (ref 1.8–7.4)
IMM GRANULOCYTES NFR BLD AUTO: 1.1 % — HIGH (ref 0–0.9)
KETONES UR-MCNC: NEGATIVE MG/DL — SIGNIFICANT CHANGE UP
LDH SERPL L TO P-CCNC: 198 U/L — SIGNIFICANT CHANGE UP (ref 135–225)
LEUKOCYTE ESTERASE UR-ACNC: ABNORMAL
LYMPHOCYTES # BLD AUTO: 1.54 K/UL — SIGNIFICANT CHANGE UP (ref 1–3.3)
LYMPHOCYTES # BLD AUTO: 18.6 % — SIGNIFICANT CHANGE UP (ref 13–44)
MCHC RBC-ENTMCNC: 27.2 PG — SIGNIFICANT CHANGE UP (ref 27–34)
MCHC RBC-ENTMCNC: 32.6 GM/DL — SIGNIFICANT CHANGE UP (ref 32–36)
MCV RBC AUTO: 83.5 FL — SIGNIFICANT CHANGE UP (ref 80–100)
MONOCYTES # BLD AUTO: 0.42 K/UL — SIGNIFICANT CHANGE UP (ref 0–0.9)
MONOCYTES NFR BLD AUTO: 5.1 % — SIGNIFICANT CHANGE UP (ref 2–14)
NEUTROPHILS # BLD AUTO: 6.09 K/UL — SIGNIFICANT CHANGE UP (ref 1.8–7.4)
NEUTROPHILS NFR BLD AUTO: 73.4 % — SIGNIFICANT CHANGE UP (ref 43–77)
NITRITE UR-MCNC: NEGATIVE — SIGNIFICANT CHANGE UP
NRBC # BLD: 0 /100 WBCS — SIGNIFICANT CHANGE UP (ref 0–0)
NRBC # FLD: 0 K/UL — SIGNIFICANT CHANGE UP (ref 0–0)
PH UR: 6 — SIGNIFICANT CHANGE UP (ref 5–8)
PLATELET # BLD AUTO: 221 K/UL — SIGNIFICANT CHANGE UP (ref 150–400)
POTASSIUM SERPL-MCNC: 3.9 MMOL/L — SIGNIFICANT CHANGE UP (ref 3.5–5.3)
POTASSIUM SERPL-SCNC: 3.9 MMOL/L — SIGNIFICANT CHANGE UP (ref 3.5–5.3)
PROT ?TM UR-MCNC: 13 MG/DL — SIGNIFICANT CHANGE UP
PROT SERPL-MCNC: 7.3 G/DL — SIGNIFICANT CHANGE UP (ref 6–8.3)
PROT UR-MCNC: NEGATIVE MG/DL — SIGNIFICANT CHANGE UP
PROT/CREAT UR-RTO: 0.2 RATIO — SIGNIFICANT CHANGE UP (ref 0–0.2)
RBC # BLD: 4.12 M/UL — SIGNIFICANT CHANGE UP (ref 3.8–5.2)
RBC # FLD: 13.7 % — SIGNIFICANT CHANGE UP (ref 10.3–14.5)
RBC CASTS # UR COMP ASSIST: 1 /HPF — SIGNIFICANT CHANGE UP (ref 0–4)
SODIUM SERPL-SCNC: 135 MMOL/L — SIGNIFICANT CHANGE UP (ref 135–145)
SP GR SPEC: 1.01 — SIGNIFICANT CHANGE UP (ref 1–1.03)
SQUAMOUS # UR AUTO: 7 /HPF — HIGH (ref 0–5)
URATE SERPL-MCNC: 5.9 MG/DL — SIGNIFICANT CHANGE UP (ref 2.5–7)
UROBILINOGEN FLD QL: 0.2 MG/DL — SIGNIFICANT CHANGE UP (ref 0.2–1)
WBC # BLD: 8.29 K/UL — SIGNIFICANT CHANGE UP (ref 3.8–10.5)
WBC # FLD AUTO: 8.29 K/UL — SIGNIFICANT CHANGE UP (ref 3.8–10.5)
WBC UR QL: 9 /HPF — HIGH (ref 0–5)

## 2024-04-27 PROCEDURE — 99222 1ST HOSP IP/OBS MODERATE 55: CPT | Mod: 25

## 2024-04-27 PROCEDURE — 59025 FETAL NON-STRESS TEST: CPT | Mod: 26

## 2024-04-27 PROCEDURE — L9996: CPT

## 2024-04-27 RX ORDER — ACETAMINOPHEN 500 MG
1000 TABLET ORAL ONCE
Refills: 0 | Status: COMPLETED | OUTPATIENT
Start: 2024-04-27 | End: 2024-04-27

## 2024-04-27 RX ORDER — SODIUM CHLORIDE 9 MG/ML
3 INJECTION INTRAMUSCULAR; INTRAVENOUS; SUBCUTANEOUS EVERY 8 HOURS
Refills: 0 | Status: DISCONTINUED | OUTPATIENT
Start: 2024-04-27 | End: 2024-05-12

## 2024-04-27 RX ORDER — DIPHENHYDRAMINE HCL 50 MG
25 CAPSULE ORAL ONCE
Refills: 0 | Status: COMPLETED | OUTPATIENT
Start: 2024-04-27 | End: 2024-04-27

## 2024-04-27 RX ORDER — METOCLOPRAMIDE HCL 10 MG
10 TABLET ORAL ONCE
Refills: 0 | Status: COMPLETED | OUTPATIENT
Start: 2024-04-27 | End: 2024-04-27

## 2024-04-27 RX ADMIN — Medication 10 MILLIGRAM(S): at 14:51

## 2024-04-27 RX ADMIN — Medication 25 MILLIGRAM(S): at 14:50

## 2024-04-27 RX ADMIN — Medication 1000 MILLIGRAM(S): at 14:49

## 2024-04-27 NOTE — OB PROVIDER TRIAGE NOTE - ADDITIONAL INSTRUCTIONS
Dr Garcia patient is a 33 y.o G  P EDC 5/30/2024 EGA 35.0 encounter for elevated blood pressure, headache and lightheadedness.  - Discussed with Dr. Diaz.  - Patient to be discharged home with and encouraged to return or call the office if blood pressures are >140/90, headache, blurred vision, RUQ pain, nausea, vomiting or abnormal swelling.  - Please follow up with your obstetrician at your next scheduled appointment, 4/29/24.  - Please return for decreased/no fetal movement, vaginal bleeding similar to that of a period, leaking/gush of fluid, regular contractions or requiring pain medication   - Patient educated of plan and demonstrate understanding. All questions answered. Discharge instructions provided and signed.   - Discharged at 1530

## 2024-04-27 NOTE — OB PROVIDER TRIAGE NOTE - NSOBPROVIDERNOTE_OBGYN_ALL_OB_FT
Dr Garcia patient is a 33 y.o G  P EDC 5/30/2024 EGA 35.0 Dr Garcia patient is a 33 y.o G  P EDC 5/30/2024 EGA 35.0 encounter for elevated blood pressure, headache and lightheadedness.    Plan:  -NST/BPP  -HELLP labs Dr Garcia patient is a 33 y.o G  P EDC 5/30/2024 EGA 35.0 encounter for elevated blood pressure, headache and lightheadedness.    Plan:  -NST/BPP  -HELLP labs  -F/S 138 initial daily assessment will continue to monitor for daily goals met, repeat 119. Dr Garcia patient is a 33 y.o G  P EDC 5/30/2024 EGA 35.0 encounter for elevated blood pressure, headache and lightheadedness.    Plan:  -NST/BPP 8/8  -HELLP labs  -F/S 138 initial daily assessment will continue to monitor for daily goals met, repeat 119.    1525  Dr Garcia patient is a 33 y.o G  P EDC 5/30/2024 EGA 35.0 encounter for elevated blood pressure, headache and lightheadedness.  - Discussed with Dr. Diaz.  - Patient to be discharged home with and encouraged to return or call the office if blood pressures are >140/90, headache, blurred vision, RUQ pain, nausea, vomiting or abnormal swelling.  - Please follow up with your obstetrician at your next scheduled appointment, 4/29/24.  - Please return for decreased/no fetal movement, vaginal bleeding similar to that of a period, leaking/gush of fluid, regular contractions or requiring pain medication   - Patient educated of plan and demonstrate understanding. All questions answered. Discharge instructions provided and signed.   - Discharged at 1530 Dr Garcia patient is a 33 y.o G  P EDC 5/30/2024 EGA 35.0 encounter for elevated blood pressure, headache and lightheadedness.    Plan:  -NST/BPP 8/8  -HELLP labs- negative  -F/S 138 initial daily assessment will continue to monitor for daily goals met, repeat 119.    1525  Dr Garcia patient is a 33 y.o G  P EDC 5/30/2024 EGA 35.0 encounter for elevated blood pressure, headache and lightheadedness.  - Discussed with Dr. Diaz.  - Patient to be discharged home with and encouraged to return or call the office if blood pressures are >140/90, headache, blurred vision, RUQ pain, nausea, vomiting or abnormal swelling.  - Please follow up with your obstetrician at your next scheduled appointment, 4/29/24.  - Please return for decreased/no fetal movement, vaginal bleeding similar to that of a period, leaking/gush of fluid, regular contractions or requiring pain medication   - Patient educated of plan and demonstrate understanding. All questions answered. Discharge instructions provided and signed.   - Discharged at 1530

## 2024-04-27 NOTE — OB PROVIDER TRIAGE NOTE - NSHPLABSRESULTS_GEN_ALL_CORE
11.2   8.29  )-----------( 221      ( 27 Apr 2024 14:20 )             34.4   04-27    135  |  101  |  8   ----------------------------<  131<H>  3.9   |  22  |  0.56    Ca    8.8      27 Apr 2024 14:20    TPro  7.3  /  Alb  3.6  /  TBili  <0.2  /  DBili  x   /  AST  22  /  ALT  15  /  AlkPhos  120  04-27    Urinalysis Basic - ( 27 Apr 2024 14:20 )    Color: x / Appearance: x / SG: x / pH: x  Gluc: 131 mg/dL / Ketone: x  / Bili: x / Urobili: x   Blood: x / Protein: x / Nitrite: x   Leuk Esterase: x / RBC: x / WBC x   Sq Epi: x / Non Sq Epi: x / Bacteria: x    PC ratio 0.2

## 2024-04-27 NOTE — ED ADULT TRIAGE NOTE - CHIEF COMPLAINT QUOTE
pt is 35 weeks pregnant c/o 2 days headache, lightheadedness and high BP told by GYN to come to ED, pre-eclampsia with other 2 pregnancies, currently on 81mg asa, , + fetal movement, dd   past med: gestational diabetes, hypothyroidism fs= 156

## 2024-04-27 NOTE — OB PROVIDER TRIAGE NOTE - NSHPPHYSICALEXAM_GEN_ALL_CORE
T(C): 36.7 (04-27-24 @ 13:45), Max: 36.8 (04-27-24 @ 13:21)  HR: 103 (04-27-24 @ 14:14) (100 - 111)  BP: 122/74 (04-27-24 @ 14:14) (122/74 - 148/89)  RR: 18 (04-27-24 @ 13:45) (16 - 18)  SpO2: 100% (04-27-24 @ 13:21) (100% - 100%)  General: Female sitting comfortably    Neuro: No facial asymmetry, no slurred speech, moves all 4 extremities  Mood: Alert and lucid, appropriate mood and affect  Head: Normocephalic. Atraumatic.   Eyes: No discharge, lids normal, conjunctiva normal  Lungs: No respiratory distress  Abdomen: Soft, nontender. Gravid.   TAUS:  Sono saved in ASOB.   NST:   SSE: No erythema, edema, lesions to external genitalia. Physiologic discharge present. No active, brisk bleeding.  Negative pooling. Negative Nitrazine. Negative Fern.   SVE: No fluid seen. White physiologic discharge on gloves.   EFW: T(C): 36.7 (04-27-24 @ 13:45), Max: 36.8 (04-27-24 @ 13:21)  HR: 103 (04-27-24 @ 14:14) (100 - 111)  BP: 122/74 (04-27-24 @ 14:14) (122/74 - 148/89)  RR: 18 (04-27-24 @ 13:45) (16 - 18)  SpO2: 100% (04-27-24 @ 13:21) (100% - 100%)    General: Female sitting comfortably    Neuro: No facial asymmetry, no slurred speech, moves all 4 extremities  Mood: Alert and lucid, appropriate mood and affect  Head: Normocephalic. Atraumatic.   Eyes: No discharge, lids normal, conjunctiva normal  Lungs: No respiratory distress  Abdomen: Soft, nontender. Gravid.   TAUS:  Sono saved in ASOB.   NST: 140 baseline, moderate variability, accelerations present and decelerations absent. reactive  CTX: no contractions T(C): 36.7 (04-27-24 @ 13:45), Max: 36.8 (04-27-24 @ 13:21)  HR: 103 (04-27-24 @ 14:14) (100 - 111)  BP: 122/74 (04-27-24 @ 14:14) (122/74 - 148/89)  RR: 18 (04-27-24 @ 13:45) (16 - 18)  SpO2: 100% (04-27-24 @ 13:21) (100% - 100%)    General: Female sitting comfortably    Neuro: No facial asymmetry, no slurred speech, moves all 4 extremities  Mood: Alert and lucid, appropriate mood and affect  Head: Normocephalic. Atraumatic.   Eyes: No discharge, lids normal, conjunctiva normal  Lungs: No respiratory distress, lung sounds clear B/L  Abdomen: Soft, nontender. Gravid.   Lower extremities: DTR +2 B/L  TAUS:  cephalic presentation, posterior placenta, m mode 144, NOE 11.30, BPP 8/8. Sono saved in ASOB.   NST: 140 baseline, moderate variability, accelerations present and decelerations absent. reactive  CTX: no contractions

## 2024-04-27 NOTE — OB RN TRIAGE NOTE - NS_OBGYNHISTORY_OBGYN_ALL_OB_FT
2015 c/s PEC  2023 c/s 36wk pec gdm 2015 c/s PEC  2023 c/s 36wk pec gdm    Current- GDMA2   Insulin 12U @ dinner   NPH @12U @ HS

## 2024-04-27 NOTE — OB PROVIDER TRIAGE NOTE - HISTORY OF PRESENT ILLNESS
Dr Garcia patient is a 33 y.o G  P EDC 5/30/2024 EGA 35.0 who presents with c/o her blood pressure being elevated at home (155/108), headache which she rates as 7/10, dizziness and lightheadness which started    . Patient         Antepartum History:  -GDMA 2 on insulin 12 units HS  -C/S 2015 complicated with PEC  -C/S 2023 @36 weeks complicated with PEC and GDM.  -Asthma- albuterol  -Cholecystectomy  -Hypothyroidism on synthroid   Dr Garcia patient is a 33 y.o  EDC 2024 EGA 35.0 who presents with c/o her blood pressure being elevated at home (155/108), a headache which she rates as 7/10 and lightheadedness which started lastnight,. Patient denies taking any medication for the headache but is requesting management at this time. Patient denies blurred vision, scotomas, RUQ/epigastric pain, nausea/vomiting or abnormal swelling. Patient states she has a 24hr urine collect to start tomorrow and return to the office on monday. At time of HPI patient reports fetal movement, denies vaginal bleeding, loss of fluids, contractions and/cramping.     Antepartum History:  -GDMA 2 on insulin 12 units HS and 12units NPH with dinner  -C/S  complicated with PEC  -C/S  @36 weeks complicated with PEC and GDM.  -SAB  with pill  -Asthma- albuterol last used 1 month ago  -Cholecystectomy   -Hypothyroidism on synthroid 200mcg    No records in [facility] for review

## 2024-04-30 DIAGNOSIS — O26.893 OTHER SPECIFIED PREGNANCY RELATED CONDITIONS, THIRD TRIMESTER: ICD-10-CM

## 2024-04-30 DIAGNOSIS — O09.293 SUPERVISION OF PREGNANCY WITH OTHER POOR REPRODUCTIVE OR OBSTETRIC HISTORY, THIRD TRIMESTER: ICD-10-CM

## 2024-04-30 DIAGNOSIS — J45.998 OTHER ASTHMA: ICD-10-CM

## 2024-04-30 DIAGNOSIS — E03.9 HYPOTHYROIDISM, UNSPECIFIED: ICD-10-CM

## 2024-04-30 DIAGNOSIS — O99.283 ENDOCRINE, NUTRITIONAL AND METABOLIC DISEASES COMPLICATING PREGNANCY, THIRD TRIMESTER: ICD-10-CM

## 2024-04-30 DIAGNOSIS — O99.513 DISEASES OF THE RESPIRATORY SYSTEM COMPLICATING PREGNANCY, THIRD TRIMESTER: ICD-10-CM

## 2024-04-30 DIAGNOSIS — O24.414 GESTATIONAL DIABETES MELLITUS IN PREGNANCY, INSULIN CONTROLLED: ICD-10-CM

## 2024-04-30 DIAGNOSIS — O34.219 MATERNAL CARE FOR UNSPECIFIED TYPE SCAR FROM PREVIOUS CESAREAN DELIVERY: ICD-10-CM

## 2024-04-30 DIAGNOSIS — Z87.59 PERSONAL HISTORY OF OTHER COMPLICATIONS OF PREGNANCY, CHILDBIRTH AND THE PUERPERIUM: ICD-10-CM

## 2024-04-30 DIAGNOSIS — R03.0 ELEVATED BLOOD-PRESSURE READING, WITHOUT DIAGNOSIS OF HYPERTENSION: ICD-10-CM

## 2024-04-30 DIAGNOSIS — Z3A.35 35 WEEKS GESTATION OF PREGNANCY: ICD-10-CM

## 2024-05-03 ENCOUNTER — OUTPATIENT (OUTPATIENT)
Dept: OUTPATIENT SERVICES | Facility: HOSPITAL | Age: 34
LOS: 1 days | End: 2024-05-03

## 2024-05-03 VITALS
WEIGHT: 240.08 LBS | SYSTOLIC BLOOD PRESSURE: 121 MMHG | RESPIRATION RATE: 18 BRPM | DIASTOLIC BLOOD PRESSURE: 88 MMHG | HEIGHT: 63 IN | OXYGEN SATURATION: 97 % | TEMPERATURE: 98 F | HEART RATE: 106 BPM

## 2024-05-03 DIAGNOSIS — O09.93 SUPERVISION OF HIGH RISK PREGNANCY, UNSPECIFIED, THIRD TRIMESTER: ICD-10-CM

## 2024-05-03 DIAGNOSIS — Z98.891 HISTORY OF UTERINE SCAR FROM PREVIOUS SURGERY: Chronic | ICD-10-CM

## 2024-05-03 DIAGNOSIS — Z90.49 ACQUIRED ABSENCE OF OTHER SPECIFIED PARTS OF DIGESTIVE TRACT: Chronic | ICD-10-CM

## 2024-05-03 DIAGNOSIS — Z90.09 ACQUIRED ABSENCE OF OTHER PART OF HEAD AND NECK: Chronic | ICD-10-CM

## 2024-05-03 LAB
APPEARANCE UR: ABNORMAL
BACTERIA # UR AUTO: ABNORMAL /HPF
BILIRUB UR-MCNC: NEGATIVE — SIGNIFICANT CHANGE UP
BLD GP AB SCN SERPL QL: NEGATIVE — SIGNIFICANT CHANGE UP
CAST: 0 /LPF — SIGNIFICANT CHANGE UP (ref 0–4)
COLOR SPEC: YELLOW — SIGNIFICANT CHANGE UP
DIFF PNL FLD: NEGATIVE — SIGNIFICANT CHANGE UP
GLUCOSE UR QL: NEGATIVE MG/DL — SIGNIFICANT CHANGE UP
KETONES UR-MCNC: ABNORMAL MG/DL
LEUKOCYTE ESTERASE UR-ACNC: ABNORMAL
NITRITE UR-MCNC: NEGATIVE — SIGNIFICANT CHANGE UP
PH UR: 6 — SIGNIFICANT CHANGE UP (ref 5–8)
PROT UR-MCNC: NEGATIVE MG/DL — SIGNIFICANT CHANGE UP
RBC CASTS # UR COMP ASSIST: 1 /HPF — SIGNIFICANT CHANGE UP (ref 0–4)
RH IG SCN BLD-IMP: POSITIVE — SIGNIFICANT CHANGE UP
SP GR SPEC: 1.02 — SIGNIFICANT CHANGE UP (ref 1–1.03)
SQUAMOUS # UR AUTO: 5 /HPF — SIGNIFICANT CHANGE UP (ref 0–5)
UROBILINOGEN FLD QL: 0.2 MG/DL — SIGNIFICANT CHANGE UP (ref 0.2–1)
WBC UR QL: 5 /HPF — SIGNIFICANT CHANGE UP (ref 0–5)

## 2024-05-03 NOTE — OB PST NOTE - NSHPREVIEWOFSYSTEMS_GEN_ALL_CORE
General: No fever, chills, sweating, anorexia, weight loss or weight gain. No polyphagia, polyurea, polydypsia, malaise, or fatigue    Skin: No rashes, itching, or dryness. No change in size/color of moles. No tumors, brittle nails, pitted nails, or hair loss    Breast: No tenderness, lumps, or nipple discharge      Ophthalmologic: + corrective lenses   No diplopia, photophobia, lacrimation, blurred Vision , or eye discharge    ENMT Symptoms: No hearing difficulty, ear pain, tinnitus, or vertigo. No sinus symptoms, nasal congestion, nasal   discharge, or nasal obstruction    Respiratory and Thorax: No wheezing, dyspnea, cough, hemoptysis, or pleuritic chest pain     Cardiovascular: No chest pain, palpitations, dyspnea on exertion, orthopnea, paroxysmal nocturnal dyspnea,   peripheral edema, or claudication    Gastrointestinal: + constipation  No nausea, vomiting, diarrhea, constipation, change in bowel habits, flatulence, abdominal pain, or melena    Genitourinary/ Pelvis: No hematuria, renal colic, or flank pain.  No urine discoloration, incontinence, dysuria, or urinary hesitancy. Increased urinary frequency. No nocturia, abnormal vaginal bleeding, vaginal discharge, spotting, pelvic pain, or vaginal leakage    Musculoskeletal: + lower back, knees, hips pain  No arthralgia, arthritis, joint swelling, muscle cramping, muscle weakness, neck pain, arm pain, or leg pain    Neurological: No transient paralysis, weakness, paresthesias, or seizures. No syncope, tremors, vertigo, loss of sensation, difficulty walking, loss of consciousness, hemiparesis, confusion, or facial palsy    Psychiatric: No suicidal ideation, depression, anxiety, insomnia, memory loss, paranoia, mood swings, agitation, hallucinations, or hyperactivity    Hematology: No gum bleeding, nose bleeding, or skin lumps    Lymphatic: No enlarged or tender lymph nodes. + lower extremity swelling    Endocrine: No heat or cold intolerance    Immunologic: No recurrent or persistent infections General: No fever, chills, sweating, anorexia, weight loss or weight gain. No polyphagia, polyurea, polydypsia, malaise, or fatigue    Skin: No rashes, itching, or dryness. No change in size/color of moles. No tumors, brittle nails, pitted nails, or hair loss    Breast: No tenderness, lumps, or nipple discharge      Ophthalmologic: + corrective lenses   No diplopia, photophobia, lacrimation, blurred Vision , or eye discharge    ENMT Symptoms: No hearing difficulty, ear pain, tinnitus, or vertigo. No sinus symptoms, nasal congestion, nasal   discharge, or nasal obstruction    Respiratory and Thorax: Asthma well controlled   No wheezing, dyspnea, cough, hemoptysis, or pleuritic chest pain     Cardiovascular: No chest pain, palpitations, dyspnea on exertion, orthopnea, paroxysmal nocturnal dyspnea,   peripheral edema, or claudication    Gastrointestinal: + constipation  No nausea, vomiting, diarrhea, constipation, change in bowel habits, flatulence, abdominal pain, or melena    Genitourinary/ Pelvis: No hematuria, renal colic, or flank pain.  No urine discoloration, incontinence, dysuria, or urinary hesitancy. Increased urinary frequency. No nocturia, abnormal vaginal bleeding, vaginal discharge, spotting, pelvic pain, or vaginal leakage    Musculoskeletal: + lower back, knees, hips pain  No arthralgia, arthritis, joint swelling, muscle cramping, muscle weakness, neck pain, arm pain, or leg pain    Neurological: No transient paralysis, weakness, paresthesias, or seizures. No syncope, tremors, vertigo, loss of sensation, difficulty walking, loss of consciousness, hemiparesis, confusion, or facial palsy    Psychiatric: No suicidal ideation, depression, anxiety, insomnia, memory loss, paranoia, mood swings, agitation, hallucinations, or hyperactivity    Hematology: No gum bleeding, nose bleeding, or skin lumps    Lymphatic: No enlarged or tender lymph nodes. + lower extremity swelling    Endocrine: No heat or cold intolerance    Immunologic: No recurrent or persistent infections

## 2024-05-03 NOTE — OB PST NOTE - PROBLEM SELECTOR PLAN 1
Other Specify
Schedule for repeat  section, bilateral tubal ligation tentatively on 24. Pre op instructions, chlorhexidine gluconate soap given and explained. Pt verbalized understanding.

## 2024-05-03 NOTE — OB PST NOTE - NSANTHOSAYNRD_GEN_A_CORE
No. NISHA screening performed.  STOP BANG Legend: 0-2 = LOW Risk; 3-4 = INTERMEDIATE Risk; 5-8 = HIGH Risk

## 2024-05-03 NOTE — OB PST NOTE - NSICDXPASTMEDICALHX_GEN_ALL_CORE_FT
PAST MEDICAL HISTORY:  Hypothyroid     Seasonal asthma      PAST MEDICAL HISTORY:  GDM (gestational diabetes mellitus)     HTN (hypertension)     Hypothyroid     Seasonal asthma

## 2024-05-03 NOTE — OB PST NOTE - NSICDXPASTSURGICALHX_GEN_ALL_CORE_FT
PAST SURGICAL HISTORY:  H/O  section     History of cholecystectomy      PAST SURGICAL HISTORY:  H/O  section     History of cholecystectomy     History of stapedectomy

## 2024-05-03 NOTE — OB PST NOTE - NSHPPHYSICALEXAM_GEN_ALL_CORE
Constitutional: Well Developed, Well Groomed, Well Nourished, No Distress    Eyes: PERRL, EOMI, conjunctiva clear    Ears: Normal    Mouth & Gums: Normal, moist    Neck: Supple, no JVD, normal thyroid glands    Breast: Normal shape    Back: Normal shape, ROM intact, strength intact, no vertebral tenderness    Respiratory: Airway patent, breath sounds equal, good air movement, respiration non-labored, clear to auscultation bilateral, no chest wall tenderness, no intercostal retractions, no rales, no wheezes, no rhonchi    Cardiovascular:  Regular rate and rhythm, no rubs or murmur, normal PMI    Gastrointestinal: Soft, non-tender, bowel sound normal, no bruit    Extremities: No clubbing, cyanosis, or 1+pedal edema    Vascular: DP pulse normal    Neurological: alert & oriented x 3, sensation intact, deep reflexes intact, cranial nerve intact, normal strength    Skin: warm and dry, normal color    Lymph Nodes: normal posterior cervical lymph node, normal anterior cervical lymph node, normal supraclavicular lymph node    Musculoskeletal: 1+ pitting edema  ROM intact, warmth, or calf tenderness. Normal strength    Psychiatric: normal affect, normal behavior

## 2024-05-08 ENCOUNTER — TRANSCRIPTION ENCOUNTER (OUTPATIENT)
Age: 34
End: 2024-05-08

## 2024-05-09 ENCOUNTER — INPATIENT (INPATIENT)
Facility: HOSPITAL | Age: 34
LOS: 1 days | Discharge: HOME | End: 2024-05-11
Attending: SPECIALIST | Admitting: SPECIALIST
Payer: MEDICAID

## 2024-05-09 ENCOUNTER — TRANSCRIPTION ENCOUNTER (OUTPATIENT)
Age: 34
End: 2024-05-09

## 2024-05-09 VITALS — HEART RATE: 90 BPM | DIASTOLIC BLOOD PRESSURE: 74 MMHG | SYSTOLIC BLOOD PRESSURE: 129 MMHG

## 2024-05-09 DIAGNOSIS — Z90.09 ACQUIRED ABSENCE OF OTHER PART OF HEAD AND NECK: Chronic | ICD-10-CM

## 2024-05-09 DIAGNOSIS — Z90.49 ACQUIRED ABSENCE OF OTHER SPECIFIED PARTS OF DIGESTIVE TRACT: Chronic | ICD-10-CM

## 2024-05-09 DIAGNOSIS — O09.93 SUPERVISION OF HIGH RISK PREGNANCY, UNSPECIFIED, THIRD TRIMESTER: ICD-10-CM

## 2024-05-09 DIAGNOSIS — Z98.891 HISTORY OF UTERINE SCAR FROM PREVIOUS SURGERY: Chronic | ICD-10-CM

## 2024-05-09 LAB
ALBUMIN SERPL ELPH-MCNC: 3.6 G/DL — SIGNIFICANT CHANGE UP (ref 3.3–5)
ALP SERPL-CCNC: 125 U/L — HIGH (ref 40–120)
ALT FLD-CCNC: 13 U/L — SIGNIFICANT CHANGE UP (ref 4–33)
ANION GAP SERPL CALC-SCNC: 14 MMOL/L — SIGNIFICANT CHANGE UP (ref 7–14)
AST SERPL-CCNC: 23 U/L — SIGNIFICANT CHANGE UP (ref 4–32)
BASOPHILS # BLD AUTO: 0.04 K/UL — SIGNIFICANT CHANGE UP (ref 0–0.2)
BASOPHILS NFR BLD AUTO: 0.5 % — SIGNIFICANT CHANGE UP (ref 0–2)
BILIRUB SERPL-MCNC: 0.2 MG/DL — SIGNIFICANT CHANGE UP (ref 0.2–1.2)
BLD GP AB SCN SERPL QL: NEGATIVE — SIGNIFICANT CHANGE UP
BUN SERPL-MCNC: 9 MG/DL — SIGNIFICANT CHANGE UP (ref 7–23)
CALCIUM SERPL-MCNC: 8.8 MG/DL — SIGNIFICANT CHANGE UP (ref 8.4–10.5)
CHLORIDE SERPL-SCNC: 104 MMOL/L — SIGNIFICANT CHANGE UP (ref 98–107)
CO2 SERPL-SCNC: 18 MMOL/L — LOW (ref 22–31)
CREAT SERPL-MCNC: 0.53 MG/DL — SIGNIFICANT CHANGE UP (ref 0.5–1.3)
EGFR: 125 ML/MIN/1.73M2 — SIGNIFICANT CHANGE UP
EOSINOPHIL # BLD AUTO: 0.14 K/UL — SIGNIFICANT CHANGE UP (ref 0–0.5)
EOSINOPHIL NFR BLD AUTO: 1.8 % — SIGNIFICANT CHANGE UP (ref 0–6)
GLUCOSE BLDC GLUCOMTR-MCNC: 94 MG/DL — SIGNIFICANT CHANGE UP (ref 70–99)
GLUCOSE SERPL-MCNC: 84 MG/DL — SIGNIFICANT CHANGE UP (ref 70–99)
HCT VFR BLD CALC: 31.2 % — LOW (ref 34.5–45)
HGB BLD-MCNC: 10.5 G/DL — LOW (ref 11.5–15.5)
IANC: 5.4 K/UL — SIGNIFICANT CHANGE UP (ref 1.8–7.4)
IMM GRANULOCYTES NFR BLD AUTO: 0.9 % — SIGNIFICANT CHANGE UP (ref 0–0.9)
LYMPHOCYTES # BLD AUTO: 1.84 K/UL — SIGNIFICANT CHANGE UP (ref 1–3.3)
LYMPHOCYTES # BLD AUTO: 23.1 % — SIGNIFICANT CHANGE UP (ref 13–44)
MCHC RBC-ENTMCNC: 28 PG — SIGNIFICANT CHANGE UP (ref 27–34)
MCHC RBC-ENTMCNC: 33.7 GM/DL — SIGNIFICANT CHANGE UP (ref 32–36)
MCV RBC AUTO: 83.2 FL — SIGNIFICANT CHANGE UP (ref 80–100)
MONOCYTES # BLD AUTO: 0.48 K/UL — SIGNIFICANT CHANGE UP (ref 0–0.9)
MONOCYTES NFR BLD AUTO: 6 % — SIGNIFICANT CHANGE UP (ref 2–14)
NEUTROPHILS # BLD AUTO: 5.4 K/UL — SIGNIFICANT CHANGE UP (ref 1.8–7.4)
NEUTROPHILS NFR BLD AUTO: 67.7 % — SIGNIFICANT CHANGE UP (ref 43–77)
NRBC # BLD: 0 /100 WBCS — SIGNIFICANT CHANGE UP (ref 0–0)
NRBC # FLD: 0 K/UL — SIGNIFICANT CHANGE UP (ref 0–0)
PLATELET # BLD AUTO: 215 K/UL — SIGNIFICANT CHANGE UP (ref 150–400)
POTASSIUM SERPL-MCNC: 4 MMOL/L — SIGNIFICANT CHANGE UP (ref 3.5–5.3)
POTASSIUM SERPL-SCNC: 4 MMOL/L — SIGNIFICANT CHANGE UP (ref 3.5–5.3)
PROT SERPL-MCNC: 6.7 G/DL — SIGNIFICANT CHANGE UP (ref 6–8.3)
RBC # BLD: 3.75 M/UL — LOW (ref 3.8–5.2)
RBC # FLD: 13.4 % — SIGNIFICANT CHANGE UP (ref 10.3–14.5)
RH IG SCN BLD-IMP: POSITIVE — SIGNIFICANT CHANGE UP
SODIUM SERPL-SCNC: 136 MMOL/L — SIGNIFICANT CHANGE UP (ref 135–145)
WBC # BLD: 7.97 K/UL — SIGNIFICANT CHANGE UP (ref 3.8–10.5)
WBC # FLD AUTO: 7.97 K/UL — SIGNIFICANT CHANGE UP (ref 3.8–10.5)

## 2024-05-09 PROCEDURE — 88302 TISSUE EXAM BY PATHOLOGIST: CPT | Mod: 26

## 2024-05-09 DEVICE — SURGICEL SNOW 2 X 4": Type: IMPLANTABLE DEVICE | Status: FUNCTIONAL

## 2024-05-09 RX ORDER — MORPHINE SULFATE 50 MG/1
0.1 CAPSULE, EXTENDED RELEASE ORAL ONCE
Refills: 0 | Status: DISCONTINUED | OUTPATIENT
Start: 2024-05-09 | End: 2024-05-11

## 2024-05-09 RX ORDER — MONTELUKAST 4 MG/1
10 TABLET, CHEWABLE ORAL DAILY
Refills: 0 | Status: DISCONTINUED | OUTPATIENT
Start: 2024-05-09 | End: 2024-05-11

## 2024-05-09 RX ORDER — MAGNESIUM HYDROXIDE 400 MG/1
30 TABLET, CHEWABLE ORAL
Refills: 0 | Status: DISCONTINUED | OUTPATIENT
Start: 2024-05-09 | End: 2024-05-11

## 2024-05-09 RX ORDER — FAMOTIDINE 10 MG/ML
20 INJECTION INTRAVENOUS ONCE
Refills: 0 | Status: COMPLETED | OUTPATIENT
Start: 2024-05-09 | End: 2024-05-09

## 2024-05-09 RX ORDER — SIMETHICONE 80 MG/1
80 TABLET, CHEWABLE ORAL EVERY 4 HOURS
Refills: 0 | Status: DISCONTINUED | OUTPATIENT
Start: 2024-05-09 | End: 2024-05-11

## 2024-05-09 RX ORDER — ACETAMINOPHEN 500 MG
975 TABLET ORAL
Refills: 0 | Status: DISCONTINUED | OUTPATIENT
Start: 2024-05-09 | End: 2024-05-11

## 2024-05-09 RX ORDER — OXYTOCIN 10 UNIT/ML
333.33 VIAL (ML) INJECTION
Qty: 20 | Refills: 0 | Status: DISCONTINUED | OUTPATIENT
Start: 2024-05-09 | End: 2024-05-10

## 2024-05-09 RX ORDER — CEFAZOLIN SODIUM 1 G
2000 VIAL (EA) INJECTION EVERY 8 HOURS
Refills: 0 | Status: COMPLETED | OUTPATIENT
Start: 2024-05-09 | End: 2024-05-10

## 2024-05-09 RX ORDER — SODIUM CHLORIDE 9 MG/ML
1000 INJECTION, SOLUTION INTRAVENOUS
Refills: 0 | Status: DISCONTINUED | OUTPATIENT
Start: 2024-05-09 | End: 2024-05-11

## 2024-05-09 RX ORDER — LABETALOL HCL 100 MG
100 TABLET ORAL
Refills: 0 | Status: DISCONTINUED | OUTPATIENT
Start: 2024-05-09 | End: 2024-05-11

## 2024-05-09 RX ORDER — BUTORPHANOL TARTRATE 2 MG/ML
0.25 INJECTION, SOLUTION INTRAMUSCULAR; INTRAVENOUS EVERY 6 HOURS
Refills: 0 | Status: DISCONTINUED | OUTPATIENT
Start: 2024-05-09 | End: 2024-05-09

## 2024-05-09 RX ORDER — OXYCODONE HYDROCHLORIDE 5 MG/1
10 TABLET ORAL
Refills: 0 | Status: DISCONTINUED | OUTPATIENT
Start: 2024-05-09 | End: 2024-05-09

## 2024-05-09 RX ORDER — SODIUM CHLORIDE 9 MG/ML
1000 INJECTION, SOLUTION INTRAVENOUS
Refills: 0 | Status: DISCONTINUED | OUTPATIENT
Start: 2024-05-09 | End: 2024-05-10

## 2024-05-09 RX ORDER — CEFAZOLIN SODIUM 1 G
2000 VIAL (EA) INJECTION EVERY 8 HOURS
Refills: 0 | Status: DISCONTINUED | OUTPATIENT
Start: 2024-05-09 | End: 2024-05-09

## 2024-05-09 RX ORDER — DEXAMETHASONE 0.5 MG/5ML
4 ELIXIR ORAL EVERY 6 HOURS
Refills: 0 | Status: DISCONTINUED | OUTPATIENT
Start: 2024-05-09 | End: 2024-05-11

## 2024-05-09 RX ORDER — KETOROLAC TROMETHAMINE 30 MG/ML
30 SYRINGE (ML) INJECTION EVERY 6 HOURS
Refills: 0 | Status: DISCONTINUED | OUTPATIENT
Start: 2024-05-09 | End: 2024-05-10

## 2024-05-09 RX ORDER — ONDANSETRON 8 MG/1
4 TABLET, FILM COATED ORAL EVERY 6 HOURS
Refills: 0 | Status: DISCONTINUED | OUTPATIENT
Start: 2024-05-09 | End: 2024-05-11

## 2024-05-09 RX ORDER — CITRIC ACID/SODIUM CITRATE 300-500 MG
30 SOLUTION, ORAL ORAL ONCE
Refills: 0 | Status: COMPLETED | OUTPATIENT
Start: 2024-05-09 | End: 2024-05-09

## 2024-05-09 RX ORDER — CHLORHEXIDINE GLUCONATE 213 G/1000ML
1 SOLUTION TOPICAL DAILY
Refills: 0 | Status: DISCONTINUED | OUTPATIENT
Start: 2024-05-09 | End: 2024-05-10

## 2024-05-09 RX ORDER — TETANUS TOXOID, REDUCED DIPHTHERIA TOXOID AND ACELLULAR PERTUSSIS VACCINE, ADSORBED 5; 2.5; 8; 8; 2.5 [IU]/.5ML; [IU]/.5ML; UG/.5ML; UG/.5ML; UG/.5ML
0.5 SUSPENSION INTRAMUSCULAR ONCE
Refills: 0 | Status: DISCONTINUED | OUTPATIENT
Start: 2024-05-09 | End: 2024-05-11

## 2024-05-09 RX ORDER — IBUPROFEN 200 MG
600 TABLET ORAL EVERY 6 HOURS
Refills: 0 | Status: COMPLETED | OUTPATIENT
Start: 2024-05-09 | End: 2025-04-07

## 2024-05-09 RX ORDER — OXYCODONE HYDROCHLORIDE 5 MG/1
5 TABLET ORAL
Refills: 0 | Status: DISCONTINUED | OUTPATIENT
Start: 2024-05-09 | End: 2024-05-10

## 2024-05-09 RX ORDER — OXYCODONE HYDROCHLORIDE 5 MG/1
5 TABLET ORAL
Refills: 0 | Status: DISCONTINUED | OUTPATIENT
Start: 2024-05-09 | End: 2024-05-11

## 2024-05-09 RX ORDER — NALOXONE HYDROCHLORIDE 4 MG/.1ML
0.1 SPRAY NASAL
Refills: 0 | Status: DISCONTINUED | OUTPATIENT
Start: 2024-05-09 | End: 2024-05-11

## 2024-05-09 RX ORDER — HEPARIN SODIUM 5000 [USP'U]/ML
5000 INJECTION INTRAVENOUS; SUBCUTANEOUS EVERY 12 HOURS
Refills: 0 | Status: DISCONTINUED | OUTPATIENT
Start: 2024-05-09 | End: 2024-05-10

## 2024-05-09 RX ORDER — DIPHENHYDRAMINE HCL 50 MG
25 CAPSULE ORAL EVERY 6 HOURS
Refills: 0 | Status: DISCONTINUED | OUTPATIENT
Start: 2024-05-09 | End: 2024-05-11

## 2024-05-09 RX ORDER — LEVOTHYROXINE SODIUM 125 MCG
200 TABLET ORAL DAILY
Refills: 0 | Status: DISCONTINUED | OUTPATIENT
Start: 2024-05-09 | End: 2024-05-11

## 2024-05-09 RX ORDER — DIPHENHYDRAMINE HCL 50 MG
25 CAPSULE ORAL EVERY 4 HOURS
Refills: 0 | Status: DISCONTINUED | OUTPATIENT
Start: 2024-05-09 | End: 2024-05-11

## 2024-05-09 RX ORDER — LANOLIN
1 OINTMENT (GRAM) TOPICAL EVERY 6 HOURS
Refills: 0 | Status: DISCONTINUED | OUTPATIENT
Start: 2024-05-09 | End: 2024-05-11

## 2024-05-09 RX ORDER — OXYCODONE HYDROCHLORIDE 5 MG/1
5 TABLET ORAL ONCE
Refills: 0 | Status: DISCONTINUED | OUTPATIENT
Start: 2024-05-09 | End: 2024-05-11

## 2024-05-09 RX ADMIN — Medication 100 MILLIGRAM(S): at 19:30

## 2024-05-09 RX ADMIN — Medication 975 MILLIGRAM(S): at 21:30

## 2024-05-09 RX ADMIN — FAMOTIDINE 20 MILLIGRAM(S): 10 INJECTION INTRAVENOUS at 15:32

## 2024-05-09 RX ADMIN — Medication 30 MILLILITER(S): at 15:32

## 2024-05-09 RX ADMIN — SODIUM CHLORIDE 200 MILLILITER(S): 9 INJECTION, SOLUTION INTRAVENOUS at 15:32

## 2024-05-09 RX ADMIN — Medication 100 MILLIGRAM(S): at 21:05

## 2024-05-09 RX ADMIN — Medication 975 MILLIGRAM(S): at 21:05

## 2024-05-09 NOTE — OB PROVIDER H&P - NSRISKFACTORSDETA_OBGYN_ALL_OB
Gestational Diabetes/Gestational Hypertension/Previous BW <2500/Previous  <37 weeks/Other Poor Pregnancy Outcome/Referral for High Risk Care

## 2024-05-09 NOTE — OB RN PATIENT PROFILE - FALL HARM RISK - UNIVERSAL INTERVENTIONS
well appearing, NAD, possible exposure  to CO in building, CO levels here 1.2, asymptomatic now. advised to consider staying with family/friends while apartment building complex CO issue gets sorted out.
Bed in lowest position, wheels locked, appropriate side rails in place/Call bell, personal items and telephone in reach/Instruct patient to call for assistance before getting out of bed or chair/Non-slip footwear when patient is out of bed/[place] to call system/Physically safe environment - no spills, clutter or unnecessary equipment/Purposeful Proactive Rounding/Room/bathroom lighting operational, light cord in reach

## 2024-05-09 NOTE — DISCHARGE NOTE OB - CARE PROVIDER_API CALL
Jina Garcia  Obstetrics and Gynecology  [address] [place] [address], [address]  [place] [address]  Phone: [phone]  Fax: [phone]  Follow Up Time:

## 2024-05-09 NOTE — OB PROVIDER H&P - NSICDXPASTSURGICALHX_GEN_ALL_CORE_FT
PAST SURGICAL HISTORY:  H/O  section     History of cholecystectomy     History of stapedectomy

## 2024-05-09 NOTE — DISCHARGE NOTE OB - MEDICATION SUMMARY - MEDICATIONS TO TAKE
I will START or STAY ON the medications listed below when I get home from the hospital:    ibuprofen 600 mg oral tablet  -- 1 tab(s) by mouth every 6 hours  -- Indication: For Single delivery by  section

## 2024-05-09 NOTE — OB PROVIDER H&P - ALERT: PERTINENT HISTORY
None 1st Trimester Sonogram/20 Week Level II Sonogram/BioPhysical Profile(s)/Non Invasive Prenatal Screen (NIPS)/Fetal Non-Stress Test (NST)

## 2024-05-09 NOTE — OB PROVIDER H&P - NS_PRENATALMEDS_OBGYN_A_OB
Prenatal Vitamins/Antihypertensives/Aspirin/Other Prenatal Vitamins/Insulin/Antihypertensives/Aspirin/Other

## 2024-05-09 NOTE — OB PROVIDER DELIVERY SUMMARY - NSPROVIDERDELIVERYNOTE_OBGYN_ALL_OB_FT
scheduled rLTCS+BS @37wk for PEC  Viable male infant, apgars 8/9, weight 2840g  Vacuum assisted delivery attempted with 2 pulls and 2 pop offs. Additional assistance called to room. Head delivered after incising muscle with bandage scissors  Hysterotomy closed in 1 layer using caprosyn  Surgicel powder placed over rectus muscles  Filmy adhesions from bladder to ANGELIQUE. Grossly normal tubes and ovaries.  Abdomen closed in standard fashion  Pt and infant to recovery in stable condition  Bilateral fallopian tubes to pathology  Patient for Ancef for 24 hours due to additional assistance called to room to deliver infant's head  QBL: 267   IVF: 1000    UOP: 150    Dictation #[id number]

## 2024-05-09 NOTE — OB PROVIDER DELIVERY SUMMARY - NSANESTHESIACS_OBGYN_ALL_OB
Spinal Valtrex Counseling: I discussed with the patient the risks of valacyclovir including but not limited to kidney damage, nausea, vomiting and severe allergy.  The patient understands that if the infection seems to be worsening or is not improving, they are to call.

## 2024-05-09 NOTE — DISCHARGE NOTE OB - CARE PLAN
1 Principal Discharge DX:	Single delivery by  section  Assessment and plan of treatment:	follow up 2 weeks

## 2024-05-09 NOTE — OB PROVIDER DELIVERY SUMMARY - NSSELHIDDEN_OBGYN_ALL_OB_FT
[[email or web address]_ALL_OB_FT:[id number]],[NS_DeliveryAssist1_OBGYN_ALL_OB_FT:[id number]],[NS_DeliveryRN_OBGYN_ALL_OB_FT:[id number]]

## 2024-05-09 NOTE — OB PROVIDER H&P - NSHPPHYSICALEXAM_GEN_ALL_CORE
Physical Exam:  Vitals: ICU Vital Signs Last 24 Hrs  T(C): 36.7 (09 May 2024 13:44), Max: 36.7 (09 May 2024 13:44)  T(F): 98.1 (09 May 2024 13:44), Max: 98.1 (09 May 2024 13:44)  HR: 90 (09 May 2024 13:44) (90 - 90)  BP: 129/74 (09 May 2024 13:44) (129/74 - 129/74)  BP(mean): --  ABP: --  ABP(mean): --  RR: 17 (09 May 2024 13:44) (17 - 17)  SpO2: --    O2 Parameters below as of 09 May 2024 13:44  Patient On (Oxygen Delivery Method): room air      Gen: NAD, A+O x 3, resting comfortably  Resp: clear to ausculation bilaterally  Cardio: regular rate and rhythm   Abd: Gravid, soft, non-distended, non-tender to superficial and deep palpation in all 4 quadrants, no rebound/guarding  Ext: Warm, well perfused, 1+ nonpitting edema bilaterally    EFM: 135 bpm moderate variability with spontaneous accelerations no decelerations Reactive NST  [place]: Irregular contractions

## 2024-05-09 NOTE — OB NEONATOLOGY/PEDIATRICIAN DELIVERY SUMMARY - NSPEDSNEONOTESA_OBGYN_ALL_OB_FT
Peds called to OR for vacuum assisted C/S. 37 wk male born via CS to a 32 y/o  mother.  Maternal history of hypothyroidism, asthma; on levothyroxine, symbicort, montelukast, albuterol prn, labetolol, PNV, ASA. Maternal labs include Blood Type A+ , HIV - , RPR NR , Rubella I , Hep B - , GBS - on . ROM at delivery.  Baby emerged vigorous, crying, was w/d/s/s with APGARS of 8/** (peds left OR for another delivery). Mom plans to initiate breastfeeding, consents Hep B vaccine and consents circ.  Highest maternal temp: 36.7. EOS 0.04.    Physical Exam:  Gen: no acute distress, +grimace  HEENT:  anterior fontanel open soft and flat, nondysmorphic facies, no cleft lip/palate, ears normal set, no ear pits or tags, nares clinically patent  Resp: Normal respiratory effort without grunting or retractions, good air entry b/l, clear to auscultation bilaterally  Cardio: Present S1/S2, regular rate and rhythm, no murmurs  Abd: soft, non tender, non distended, umbilical cord with 3 vessels  Neuro: +palmar and plantar grasp, +suck, +zurdo, normal tone  Extremities: negative smith and ortolani maneuvers, moving all extremities, no clavicular crepitus or stepoff  Skin: pink, warm  Genitals: Normal male anatomy, testicle palpable in R testicle, could not palpate L testicle, Prashant 1, anus patent

## 2024-05-09 NOTE — PROCEDURAL SAFETY CHECKLIST WITH OR WITHOUT SEDATION - NSSIDESITEMARKD_GEN_ALL_CORE
not applicable
I will SWITCH the dose or number of times a day I take the medications listed below when I get home from the hospital:  None

## 2024-05-09 NOTE — OB PROVIDER H&P - NSRISKFACTORS_OBGYN_ALL_OB
Attempted to call pt daughter to discuss referral.  VM full - unable to LM. If call is returned, please warm transfer to the office to speak with me.   Yes

## 2024-05-09 NOTE — OB PROVIDER H&P - ASSESSMENT
33 year old @ 37.0 weeks, complicated by pre-eclampsia without severe features and GDMA2, admitted to L&D for scheduled repeat  and bilateral salpingectomy for sterilization, Reactive NST, asymptomatic, vital signs stable  - Admit to L&D  - NPO  - Euglycemic upon admission  - IV access, CBC/T&C/RPR, CMP  - Pepcid and Bicitra as per pre-op policy  - T&C 2 units PRBCs  - Anesthesia consult   - Consent to be discussed and obtained by Dr. Garcia  Risks, benefits, alternatives, and possible complications have been discussed in detail with the patient in her native language. Pre-admission, admission, and post admission procedures and expectations were discussed in detail. All questions answered, all appropriate hospital consents were signed. Informed consent was obtained  - Discussed with Dr. Garcia

## 2024-05-09 NOTE — OB RN INTRAOPERATIVE NOTE - NSSELHIDDEN_OBGYN_ALL_OB_FT
[NS_DeliveryAttending1_OBGYN_ALL_OB_FT:[id number]],[NS_DeliveryAssist1_OBGYN_ALL_OB_FT:[id number]],[NS_DeliveryRN_OBGYN_ALL_OB_FT:[id number]]

## 2024-05-09 NOTE — OB RN DELIVERY SUMMARY - NS_FINALEDD_OBGYN_ALL_OB_DT
Patient:   NORI FRANCO            MRN: [id number]            FIN: [id number]               Age:   63 years     Sex:  FEMALE     :  55   Associated Diagnoses:   None   Author:   CORI STINSON      Basic Information   History source: Patient, RN.      History of Present Illness   BPIC HISTORY AND PHYSICAL    Primary Care Physician: Dr. Remi Porter    Chief Complaint: productive cough, fever, headache, and chills,    History of Present Illness:   This is a 63 year old female, a patient of Dr. Remi Porter,  with no significant past medical history, who presented with a chief complaint of productive cough, fever, headache, and chills, onset 4-5 days prior to arrival.    Patient reports that she started experiencing a productive cough along with fever, headache, sore throat, body aches and generalized weakness. Due to her symptoms, she decided to present to the ED for further evaluation. Currently the patient is resting comfortably in bed. She is still experiencing all of the above noted symptoms. She denies dizziness, lightheadedness, ear ache, nasal congestion, abdominal pain, nausea, vomiting, diarrhea, vision changes, chest pain, shortness of breath, weakness, numbness or tingling.     Upon arrival to the ED, the patient was febrile (39.4) with , RR 18, /64 and pulse ox 92%. Labs were remarkable for sodium 133 and WBC 15.8. A chest x-ray showed moderate fullness and opacification in the region of the right middle lobe and lingula concerning for infectious process, and likely perihilar infiltrates bilaterally. In the ED, the patient was treated with IV ceftriaxone, IV azithromycin, DuoNebs and Tylenol. She was then admitted for further workup and management.     Review of Systems:  Positive for: productive cough, fever, headache, sore throat, body aches, generalized weakness  Negative for: dizziness, lightheadedness, ear ache, nasal congestion, abdominal pain, nausea,  30-May-2024 vomiting, diarrhea, vision changes, chest pain, shortness of breath, numbness/tingling of extremities.   All systems otherwise negative    Past Medical History:   No significant past medical history    Past Surgical History:   No significant past surgical history     Family History:   Father - Coronary artery disease with hx/o MI s/p CABG, renal failure, prostate cancer  Brother - diabetes, stenting  Mother - hypothyroidism    Social History:   .   Denies smoking or illicit substance abuse.  Drinks occasional alcohol    Allergies (1) Active Reaction  codeine Nausea, Dizziness, Abdominal Pain    Home Medications (6) Active  Calcium Oyster Shell 1250 mg (500 mg elemental calcium) oral tablet 1,250 mg = 1 tab, Oral, Q Bedtime  Lutein 6 mg oral capsule 6 mg = 1 cap, Oral, Q Bedtime  magnesium oxide oral 400 mg tablet (Mag-Ox 400) 400 mg = 1 tab, Oral, Q Bedtime  Ocuvite Extra oral tablet 1 tab, Oral, Q Bedtime  potassium gluconate 550 mg oral tablet 550 mg = 1 tab, Oral, Q Bedtime  Vitamin A and D oral capsule 1 cap, Oral, Q Bedtime   .        Physical Examination                Vital signs   Vital Signs   03/16/19 10:34 Temperature - VS 36.9 deg_C  Normal   03/16/19 06:36 Temperature - VS 37.7 deg_C  Normal    Temperature Source - VS Oral   03/16/19 06:32 Heart/Pulse Rate 90  Normal    Respiration Rate 18 breaths/min  HI   03/16/19 05:29 Heart/Pulse Rate 95 beats/min  HI    SpO2 95 %  Normal   03/16/19 05:26 Respiration Rate 18 breaths/min  HI   03/16/19 05:25 Temperature - VS 39 deg_C  HI    Temperature Source - VS Oral   03/16/19 05:25 NIBP Systolic 148 mm Hg  HI    NIBP Diastolic 85 mm Hg  Normal    NIBP Source Right Arm    NIBP MAP Manual Entry 106   .   General:  Alert, no acute distress.    Skin:  No rash.   Ears, nose, mouth and throat:  erythematous throat, right side tonsil enlarged.   Cardiovascular:  Regular rate and rhythm, No edema.    Respiratory:  Lungs are clear to auscultation.    Gastrointestinal:  Soft, Nontender, Non distended.    Neurological:  Alert and oriented to person, place, time, and situation, No focal neurological deficit observed.    Eye  Normal conjunctiva.      Medical Decision Making   Results review:    Labs between:  15-MAR-2019 11:14 to 16-MAR-2019 11:14    CBC:                 WBC  HgB  Hct  Plt  MCV  RDW   16-MAR-2019 10.7  (L) 11.5  (L) 35.3  248  86.5  14.2   15-MAR-2019 (H) 15.8  12.5  38.2  313  86.4  13.8     DIFF:                 Seg  Neutroph//ABS  Lymph//ABS  Mono//ABS  EOS/ABS   16-MAR-2019 NOT APPLICABLE  80 // (H) 8.6  12 // 1.3 7 // 0.7 0 // (L) 0.0   15-MAR-2019 NOT APPLICABLE  87 // (H) 13.6  7 // 1.2 5 // 0.9 0 // (L) 0.0     BMP:                 Na  Cl  BUN  Glu   16-MAR-2019 136  102  14  (H) 127                              K  CO2  Cr  Ca                              3.6  22  0.73  (L) 8.2   BMP:                 Na  Cl  BUN  Glu   15-MAR-2019 (L) 133  98  19  (H) 114                              K  CO2  Cr  Ca                              3.8  22  0.88  9.2     CMP:                 AST  ALT  AlkPhos  Bili  Albumin   15-MAR-2019 22  21  116  0.6  (L) 2.6     COAG:                 INR  PT  PTT  Ddimer  Fibrinogen    15-MAR-2019 1.1  11.2  (H) 33                                Result title:  XR CHEST 2V  Result status:  Final  Verified by:  RITA MCKEONEEN on 03/15/2019 18:00  IMPRESSION:Moderate fullness and opacification in the region of right middle lobe and lingula concerning for infectious process.  Likely perihilar infiltrates bilaterally.  No effusion or pneumothorax.  Normal heart size.  No acute osseous process.  Correlation advised.  Could obtain CT scan. .   Rationale:  ASSESSMENT AND PLAN:    Acute community acquired pneumonia  Possible sepsis 2/2 above   As evidenced by leukocytosis, fever, and tachycardia   Oxygenating well on room air   CXR showed moderate fullness/opacification in Rt middle lobe and lingula, and likely perihilar  infiltrates bilaterally   Blood Cxs x2 with NGTD, pending   Flu A/B rapid screen (-), respiratory Cx pending   WBC 15.8 --> 10.7, febrile (max fever 39.4 over past 24hrs)   Abx coverage with IV azithromycin and IV ceftriaxone   Started on IVFs, Tessalon Perles, Tylenol prn, DuoNebs prn, and guaifenesin prn   Monitor respiratory status closely      Mild hyperglycemia    --> 127, HbA1c pending   Monitor daily blood glucose     Acute normocytic anemia, likely dilutional   Hgb 12.5 --> 11.5, no e/o active bleeding   Monitor H&H closely     Hyponatremia - resolved    DVT PPx: Lovenox sc    Dispo planning: Pending clinical progression and recommendations from consulting physicians. Timing unclear.    PCP: Dr. Remi Porter    Charting performed by alicia Cardona for Dr. Cori Whiting,   All medical record entries made by the scribe were at my direction. I have reviewed the chart and agree that the record accurately reflects my personal performance of the history, physical exam, hospital course, and assessment and plan. .             Electronically Signed On 03/16/2019 15:38  __________________________________________________   CORI STINSON

## 2024-05-09 NOTE — OB PROVIDER H&P - HISTORY OF PRESENT ILLNESS
32 y/o F with H/O: Hypothyroidism, GDM, gestational HTN, Asthma (On Symbicort- denies any hospitalization or intubation); 36.3 /7 weeks pregnant, , CONG 2924, LMP 23 presents for pre op evaluation for repeat C section, bilateral tubal ligation tentatively on 24. As per pt, primary  section was due to elevated BP  Pt denies any abn. vag. bleeding or leaking  Pt reports + FM this A.m. Prenatal Care: Dr. Garcia    33 year old [id number] @ 37.0 weeks, CONG 2024 dated by LMP consistent with 1st Trimester US, presents to L&D for scheduled repeat  and bilateral salpingectomy for sterilization, with delivery due to gestational hypertension, diagnosed 1 month ago as per patient started on Labetalol 100 mg three times a day 1 week ago. NPO TIME 8 pm last night. Patient admits to normal fetal movement. Denies vaginal bleeding, leakage of fluid, painful contractions/lower abdominal cramping, fever/chills, shortness of breath,  chest pain, increased swelling, difficulty ambulating, loss of taste/smell, nausea/vomiting/diarrhea, rash, weakness, paresthesia, change in appetite, dizziness, lightheadedness, cough, nasal congestion, runny nose, headache, vision changes, right upper quadrant pain, epigastric pain, severe abdominal pain, increased swelling    * Chart Review:  Patient diagnosed with hypertensive disorder about 32 weeks  In chart noted proteinuria baseline 2: 234 with proteinuria : 722  Patient meeting criteria for pre-eclampsia without severe features  All HELLP labs reviewed in chart within normal limits (Cr 0.59, AST/ALT within normal limits, Plts 200s)   -----    Antepartum Course:  1. Pre-Eclampsia without severe features (24 hr urine 722) started on Labetalol 200 mg three times a day  2. GDMA2, on insulin regimen  3. Short Interval Pregnancy  4. History of 2 prior c-sections  5. Asthma   6. GBS positive     Med Hx:   Asthma - diagnosed as a child, triggered by exercise and allergies, with last used albuterol inhaler 2 months ago, taking Montelukast daily, with no history of hospitalizations or intubations   Hypothyroidism - diagnosed 9 years ago, on synthroid 200 mcg daily prescribed by PCP   Denies HTN, DM, CAD, or other medical issues    Surgical Hx: Laparoscopic Cholecystectomy

## 2024-05-09 NOTE — OB PROVIDER H&P - NS_OBGYNHISTORY_OBGYN_ALL_OB_FT
OB History: : 2015, primary  for failed induction @ 4 cm, IOL for pre-eclampsia without severe features, FT, Male, 6 lb 4 oz      - Complicated by postpartum readmission < 1 week from delivery due to wound dehiscence and infection with daily packing and antibiotics  G2: SAB, complete  G3: 6/15/2023, repeat  pre-term delivery @ 35 weeks, Male, 2150 g, breech, for pre-eclampsia with severe features on magnesium discharged on Procardia 30 mg XL daily as per patient stopped medication once Blood Pressures better about 1-2 weeks postpartum     - Op Note reviewed: adhesions for fascia to rectus, and adhesions for ANGELIQUE to anterior abdominal wall   G4: Current pregnancy     - Pre-eclampsia without severe features, 24 hour urine 722     - Started on labetalol 100 mg three times a day regimen 1 week ago, last taken at 10 am today     - GDMA2, with insulin 12 units with dinner and 12 units with bedtime (No A1C in chart)     - GBS positive   Denies fetal issues    Prenatal Labs Reviewed:  T&S: A+  Rubella: Immune   Hep B: Neg  HIV: Neg  RPR: Neg  GCT: not found in chart  G/C: Neg  GBS: Positive     Ultrasounds:   None found in chart review     GYN Hx: Denies fibroids, ovarian cysts, HSV/ STDs, abnormal pap smears

## 2024-05-09 NOTE — DISCHARGE NOTE OB - PATIENT PORTAL LINK FT
You can access the FollowMyHealth Patient Portal offered by [facility] by registering at the following website: [email or web address]. By joining [facility]’s FollowMyHealth portal, you will also be able to view your health information using other applications (apps) compatible with our system.

## 2024-05-09 NOTE — OB PROVIDER H&P - NSICDXPASTMEDICALHX_GEN_ALL_CORE_FT
PAST MEDICAL HISTORY:  GDM (gestational diabetes mellitus)     HTN (hypertension)     Hypothyroid     Seasonal asthma

## 2024-05-09 NOTE — DISCHARGE NOTE OB - MATERIALS PROVIDED
Vaccinations/[facility]  Screening Program/  Immunization Record/Breastfeeding Log/Bottle Feeding Log/Breastfeeding Mother’s Support Group Information/Guide to Postpartum Care/[facility] Hearing Screen Program/Shaken Baby Prevention Handout/Breastfeeding Guide and Packet/Discharge Medication Information for Patients and Families Pocket Guide

## 2024-05-09 NOTE — OB RN DELIVERY SUMMARY - NSSELHIDDEN_OBGYN_ALL_OB_FT
[NS_DeliveryAttending1_OBGYN_ALL_OB_FT:[id number]],[NS_DeliveryAssist1_OBGYN_ALL_OB_FT:[id number]],[NS_DeliveryRN_OBGYN_ALL_OB_FT:[id number]]

## 2024-05-09 NOTE — OB PROVIDER H&P - CURRENT PREGNANCY COMPLICATIONS, OB PROFILE
Gestational Diabetes/Hypertensive Disorder Gestational Diabetes/Hypertensive Disorder/Maternal Positive GBS/Asthma

## 2024-05-10 LAB
BASOPHILS # BLD AUTO: 0.04 K/UL — SIGNIFICANT CHANGE UP (ref 0–0.2)
BASOPHILS NFR BLD AUTO: 0.5 % — SIGNIFICANT CHANGE UP (ref 0–2)
EOSINOPHIL # BLD AUTO: 0.03 K/UL — SIGNIFICANT CHANGE UP (ref 0–0.5)
EOSINOPHIL NFR BLD AUTO: 0.3 % — SIGNIFICANT CHANGE UP (ref 0–6)
HCT VFR BLD CALC: 27.2 % — LOW (ref 34.5–45)
HGB BLD-MCNC: 8.9 G/DL — LOW (ref 11.5–15.5)
IANC: 6.91 K/UL — SIGNIFICANT CHANGE UP (ref 1.8–7.4)
IMM GRANULOCYTES NFR BLD AUTO: 0.6 % — SIGNIFICANT CHANGE UP (ref 0–0.9)
LYMPHOCYTES # BLD AUTO: 1.41 K/UL — SIGNIFICANT CHANGE UP (ref 1–3.3)
LYMPHOCYTES # BLD AUTO: 15.9 % — SIGNIFICANT CHANGE UP (ref 13–44)
MCHC RBC-ENTMCNC: 27.6 PG — SIGNIFICANT CHANGE UP (ref 27–34)
MCHC RBC-ENTMCNC: 32.7 GM/DL — SIGNIFICANT CHANGE UP (ref 32–36)
MCV RBC AUTO: 84.5 FL — SIGNIFICANT CHANGE UP (ref 80–100)
MONOCYTES # BLD AUTO: 0.44 K/UL — SIGNIFICANT CHANGE UP (ref 0–0.9)
MONOCYTES NFR BLD AUTO: 5 % — SIGNIFICANT CHANGE UP (ref 2–14)
NEUTROPHILS # BLD AUTO: 6.91 K/UL — SIGNIFICANT CHANGE UP (ref 1.8–7.4)
NEUTROPHILS NFR BLD AUTO: 77.7 % — HIGH (ref 43–77)
NRBC # BLD: 0 /100 WBCS — SIGNIFICANT CHANGE UP (ref 0–0)
NRBC # FLD: 0 K/UL — SIGNIFICANT CHANGE UP (ref 0–0)
PLATELET # BLD AUTO: 173 K/UL — SIGNIFICANT CHANGE UP (ref 150–400)
RBC # BLD: 3.22 M/UL — LOW (ref 3.8–5.2)
RBC # FLD: 13.4 % — SIGNIFICANT CHANGE UP (ref 10.3–14.5)
T PALLIDUM AB TITR SER: NEGATIVE — SIGNIFICANT CHANGE UP
WBC # BLD: 8.88 K/UL — SIGNIFICANT CHANGE UP (ref 3.8–10.5)
WBC # FLD AUTO: 8.88 K/UL — SIGNIFICANT CHANGE UP (ref 3.8–10.5)

## 2024-05-10 RX ORDER — HEPARIN SODIUM 5000 [USP'U]/ML
10000 INJECTION INTRAVENOUS; SUBCUTANEOUS EVERY 12 HOURS
Refills: 0 | Status: DISCONTINUED | OUTPATIENT
Start: 2024-05-10 | End: 2024-05-11

## 2024-05-10 RX ORDER — IBUPROFEN 200 MG
600 TABLET ORAL EVERY 6 HOURS
Refills: 0 | Status: DISCONTINUED | OUTPATIENT
Start: 2024-05-10 | End: 2024-05-11

## 2024-05-10 RX ORDER — SENNA PLUS 8.6 MG/1
2 TABLET ORAL AT BEDTIME
Refills: 0 | Status: DISCONTINUED | OUTPATIENT
Start: 2024-05-10 | End: 2024-05-11

## 2024-05-10 RX ORDER — FERROUS SULFATE 325(65) MG
325 TABLET ORAL DAILY
Refills: 0 | Status: DISCONTINUED | OUTPATIENT
Start: 2024-05-10 | End: 2024-05-11

## 2024-05-10 RX ADMIN — Medication 30 MILLIGRAM(S): at 05:57

## 2024-05-10 RX ADMIN — Medication 325 MILLIGRAM(S): at 12:15

## 2024-05-10 RX ADMIN — Medication 30 MILLIGRAM(S): at 01:00

## 2024-05-10 RX ADMIN — Medication 30 MILLIGRAM(S): at 00:45

## 2024-05-10 RX ADMIN — Medication 975 MILLIGRAM(S): at 03:01

## 2024-05-10 RX ADMIN — Medication 975 MILLIGRAM(S): at 21:17

## 2024-05-10 RX ADMIN — Medication 100 MILLIGRAM(S): at 09:45

## 2024-05-10 RX ADMIN — Medication 1 TABLET(S): at 12:20

## 2024-05-10 RX ADMIN — MONTELUKAST 10 MILLIGRAM(S): 4 TABLET, CHEWABLE ORAL at 06:00

## 2024-05-10 RX ADMIN — HEPARIN SODIUM 10000 UNIT(S): 5000 INJECTION INTRAVENOUS; SUBCUTANEOUS at 11:33

## 2024-05-10 RX ADMIN — Medication 975 MILLIGRAM(S): at 20:47

## 2024-05-10 RX ADMIN — Medication 200 MICROGRAM(S): at 06:01

## 2024-05-10 RX ADMIN — Medication 975 MILLIGRAM(S): at 15:42

## 2024-05-10 RX ADMIN — HEPARIN SODIUM 10000 UNIT(S): 5000 INJECTION INTRAVENOUS; SUBCUTANEOUS at 00:33

## 2024-05-10 RX ADMIN — OXYCODONE HYDROCHLORIDE 5 MILLIGRAM(S): 5 TABLET ORAL at 15:42

## 2024-05-10 RX ADMIN — Medication 975 MILLIGRAM(S): at 16:23

## 2024-05-10 RX ADMIN — Medication 100 MILLIGRAM(S): at 12:00

## 2024-05-10 RX ADMIN — Medication 975 MILLIGRAM(S): at 09:00

## 2024-05-10 RX ADMIN — Medication 100 MILLIGRAM(S): at 03:30

## 2024-05-10 RX ADMIN — Medication 30 MILLIGRAM(S): at 12:17

## 2024-05-10 RX ADMIN — Medication 30 MILLIGRAM(S): at 18:29

## 2024-05-10 RX ADMIN — Medication 30 MILLIGRAM(S): at 11:33

## 2024-05-10 RX ADMIN — Medication 975 MILLIGRAM(S): at 08:13

## 2024-05-10 RX ADMIN — Medication 975 MILLIGRAM(S): at 03:45

## 2024-05-10 NOTE — PROGRESS NOTE ADULT - SUBJECTIVE AND OBJECTIVE BOX
R1 Progress Note    Patient seen and examined at bedside, no acute overnight events. No acute complaints, pain well controlled. Patient is ambulating and tolerating regular diet. Has not yet passed flatus. Perez is still in place. Denies CP, SOB, N/V, HA, blurred vision, epigastric pain.    Vital Signs Last 24 Hours  T(C): 36.7 (05-10-24 @ 02:11), Max: 36.7 (05-09-24 @ 13:44)  HR: 97 (05-10-24 @ 02:11) (75 - 103)  BP: 116/58 (05-10-24 @ 02:11) (93/59 - 133/85)  RR: 18 (05-10-24 @ 02:11) (14 - 24)  SpO2: 100% (05-10-24 @ 02:11) (98% - 100%)    I&O's Summary    09 May 2024 07:01  -  10 May 2024 06:21  --------------------------------------------------------  IN: 3375 mL / OUT: 1612 mL / NET: 1763 mL        Physical Exam:  General: NAD  Abdomen: Soft, non-tender, non-distended, fundus firm  Incision: Pfannenstiel incision CDI, subcuticular suture closure  Pelvic: Lochia wnl    Labs:    Blood Type: A Positive  Antibody Screen: Negative  RPR: Negative               10.5   7.97  )-----------( 215      ( 05-09 @ 13:19 )             31.2                11.2   8.29  )-----------( 221      ( 04-27 @ 14:20 )             34.4         MEDICATIONS  (STANDING):  acetaminophen     Tablet .. 975 milliGRAM(s) Oral <User Schedule>  ceFAZolin   IVPB 2000 milliGRAM(s) IV Intermittent every 8 hours  diphtheria/tetanus/pertussis (acellular) Vaccine (Adacel) 0.5 milliLiter(s) IntraMuscular once  ferrous    sulfate 325 milliGRAM(s) Oral daily  heparin   Injectable 32279 Unit(s) SubCutaneous every 12 hours  ibuprofen  Tablet. 600 milliGRAM(s) Oral every 6 hours  ketorolac   Injectable 30 milliGRAM(s) IV Push every 6 hours  labetalol 100 milliGRAM(s) Oral two times a day  lactated ringers. 1000 milliLiter(s) (125 mL/Hr) IV Continuous <Continuous>  lactated ringers. 1000 milliLiter(s) (75 mL/Hr) IV Continuous <Continuous>  levothyroxine 200 MICROGram(s) Oral daily  montelukast 10 milliGRAM(s) Oral daily  morphine PF Spinal 0.1 milliGRAM(s) IntraThecal. once  prenatal multivitamin 1 Tablet(s) Oral daily  senna 2 Tablet(s) Oral at bedtime    MEDICATIONS  (PRN):  butorphanol Injectable 0.25 milliGRAM(s) IV Push every 6 hours PRN Pruritus  dexAMETHasone  Injectable 4 milliGRAM(s) IV Push every 6 hours PRN Nausea  diphenhydrAMINE 25 milliGRAM(s) Oral every 6 hours PRN Pruritus  diphenhydrAMINE Injectable 25 milliGRAM(s) IV Push every 4 hours PRN Pruritus  lanolin Ointment 1 Application(s) Topical every 6 hours PRN Sore Nipples  magnesium hydroxide Suspension 30 milliLiter(s) Oral two times a day PRN Constipation  naloxone Injectable 0.1 milliGRAM(s) IV Push every 3 minutes PRN For ANY of the following changes in patient status:  A. Breaths Per Minute LESS THAN 10, B. Oxygen saturation LESS THAN 90%, C. Sedation score of 6 for Stop After: 4 Times  ondansetron Injectable 4 milliGRAM(s) IV Push every 6 hours PRN Nausea  oxyCODONE    IR 5 milliGRAM(s) Oral every 3 hours PRN Moderate to Severe Pain (4-10)  oxyCODONE    IR 5 milliGRAM(s) Oral once PRN Moderate to Severe Pain (4-10)  oxyCODONE    IR 5 milliGRAM(s) Oral every 3 hours PRN Mild Pain (1 - 3)  oxyCODONE    IR 10 milliGRAM(s) Oral every 3 hours PRN Moderate Pain (4 - 6)  simethicone 80 milliGRAM(s) Chew every 4 hours PRN Gas   R1 Progress Note    Patient seen and examined at bedside, no acute overnight events. No acute complaints, pain well controlled. Patient is ambulating and tolerating regular diet. Has not yet passed flatus. Perez was removed just prior to seeing patient. Denies CP, SOB, N/V, HA, blurred vision, epigastric pain.    Vital Signs Last 24 Hours  T(C): 36.7 (05-10-24 @ 02:11), Max: 36.7 (05-09-24 @ 13:44)  HR: 97 (05-10-24 @ 02:11) (75 - 103)  BP: 116/58 (05-10-24 @ 02:11) (93/59 - 133/85)  RR: 18 (05-10-24 @ 02:11) (14 - 24)  SpO2: 100% (05-10-24 @ 02:11) (98% - 100%)    I&O's Summary    09 May 2024 07:01  -  10 May 2024 06:21  --------------------------------------------------------  IN: 3375 mL / OUT: 1612 mL / NET: 1763 mL      Physical Exam:  General: NAD  Abdomen: Soft, non-tender, non-distended, fundus firm  Incision: Pfannenstiel incision CDI, subcuticular suture closure, steri strips   Pelvic: Lochia wnl    Labs:    Blood Type: A Positive  Antibody Screen: Negative  RPR: Negative               10.5   7.97  )-----------( 215      ( 05-09 @ 13:19 )             31.2                11.2   8.29  )-----------( 221      ( 04-27 @ 14:20 )             34.4         MEDICATIONS  (STANDING):  acetaminophen     Tablet .. 975 milliGRAM(s) Oral <User Schedule>  ceFAZolin   IVPB 2000 milliGRAM(s) IV Intermittent every 8 hours  diphtheria/tetanus/pertussis (acellular) Vaccine (Adacel) 0.5 milliLiter(s) IntraMuscular once  ferrous    sulfate 325 milliGRAM(s) Oral daily  heparin   Injectable 11285 Unit(s) SubCutaneous every 12 hours  ibuprofen  Tablet. 600 milliGRAM(s) Oral every 6 hours  ketorolac   Injectable 30 milliGRAM(s) IV Push every 6 hours  labetalol 100 milliGRAM(s) Oral two times a day  lactated ringers. 1000 milliLiter(s) (125 mL/Hr) IV Continuous <Continuous>  lactated ringers. 1000 milliLiter(s) (75 mL/Hr) IV Continuous <Continuous>  levothyroxine 200 MICROGram(s) Oral daily  montelukast 10 milliGRAM(s) Oral daily  morphine PF Spinal 0.1 milliGRAM(s) IntraThecal. once  prenatal multivitamin 1 Tablet(s) Oral daily  senna 2 Tablet(s) Oral at bedtime    MEDICATIONS  (PRN):  butorphanol Injectable 0.25 milliGRAM(s) IV Push every 6 hours PRN Pruritus  dexAMETHasone  Injectable 4 milliGRAM(s) IV Push every 6 hours PRN Nausea  diphenhydrAMINE 25 milliGRAM(s) Oral every 6 hours PRN Pruritus  diphenhydrAMINE Injectable 25 milliGRAM(s) IV Push every 4 hours PRN Pruritus  lanolin Ointment 1 Application(s) Topical every 6 hours PRN Sore Nipples  magnesium hydroxide Suspension 30 milliLiter(s) Oral two times a day PRN Constipation  naloxone Injectable 0.1 milliGRAM(s) IV Push every 3 minutes PRN For ANY of the following changes in patient status:  A. Breaths Per Minute LESS THAN 10, B. Oxygen saturation LESS THAN 90%, C. Sedation score of 6 for Stop After: 4 Times  ondansetron Injectable 4 milliGRAM(s) IV Push every 6 hours PRN Nausea  oxyCODONE    IR 5 milliGRAM(s) Oral every 3 hours PRN Moderate to Severe Pain (4-10)  oxyCODONE    IR 5 milliGRAM(s) Oral once PRN Moderate to Severe Pain (4-10)  oxyCODONE    IR 5 milliGRAM(s) Oral every 3 hours PRN Mild Pain (1 - 3)  oxyCODONE    IR 10 milliGRAM(s) Oral every 3 hours PRN Moderate Pain (4 - 6)  simethicone 80 milliGRAM(s) Chew every 4 hours PRN Gas

## 2024-05-10 NOTE — PROGRESS NOTE ADULT - ASSESSMENT
**incomplete note**    34yo POD#1 rLTCS+BS c/b PEC 32yo POD#1 rLTCS+BS c/b PEC, progressing well postpartum     #PEC  - BPs ON well controlled  - denies severe features   - baseline HELLP labs wnl; repeat prn   - Cont Lab 100 TID  - continue to monitor     #Postpartum state  - Continue with po analgesia  - Increase ambulation  - Continue regular diet  - Check CBC  - Incision dressing removed  - DVT prophylaxis with 5000u heparin    Geraldine Marquez PGY-1  32yo POD#1 rLTCS+BS c/b PEC, progressing well postpartum     #PEC  - BPs ON well controlled  - denies severe features   - baseline HELLP labs wnl; repeat prn   - Cont Lab 100 TID  - continue to monitor     #Postpartum state  - Continue with po analgesia  - Increase ambulation  - Continue regular diet  - H/H 10.5/31.2->8.9/27.2  - Incision dressing removed  - DVT prophylaxis with 5000u heparin    Geraldine Marquez PGY-1  32yo POD#1 rLTCS+BS c/b PEC, progressing well postpartum     #PEC  - BPs ON well controlled  - denies severe features   - baseline HELLP labs wnl; repeat prn   - Cont Lab 100 TID  - continue to monitor     #Postpartum state  - Continue with po analgesia  - Increase ambulation  - Continue regular diet  - H/H 10.5/31.2->8.9/27.2  - Incision dressing removed  - DVT prophylaxis with 5000u heparin    Geraldine Marquez PGY-1     Patient seen and evaluated  Agree with above note  Continue present management  MD Dax

## 2024-05-11 VITALS
SYSTOLIC BLOOD PRESSURE: 136 MMHG | TEMPERATURE: 98 F | DIASTOLIC BLOOD PRESSURE: 84 MMHG | OXYGEN SATURATION: 100 % | RESPIRATION RATE: 17 BRPM | HEART RATE: 87 BPM

## 2024-05-11 RX ORDER — ASPIRIN/CALCIUM CARB/MAGNESIUM 324 MG
1 TABLET ORAL
Refills: 0 | DISCHARGE

## 2024-05-11 RX ORDER — LABETALOL HCL 100 MG
1 TABLET ORAL
Refills: 0 | DISCHARGE

## 2024-05-11 RX ORDER — LEVOTHYROXINE SODIUM 125 MCG
1 TABLET ORAL
Refills: 0 | DISCHARGE

## 2024-05-11 RX ORDER — IBUPROFEN 200 MG
1 TABLET ORAL
Qty: 0 | Refills: 0 | DISCHARGE
Start: 2024-05-11

## 2024-05-11 RX ORDER — BUDESONIDE AND FORMOTEROL FUMARATE DIHYDRATE 160; 4.5 UG/1; UG/1
2 AEROSOL RESPIRATORY (INHALATION)
Refills: 0 | DISCHARGE

## 2024-05-11 RX ORDER — MONTELUKAST 4 MG/1
1 TABLET, CHEWABLE ORAL
Refills: 0 | DISCHARGE

## 2024-05-11 RX ORDER — ALBUTEROL 90 UG/1
2 AEROSOL, METERED ORAL
Refills: 0 | DISCHARGE

## 2024-05-11 RX ADMIN — Medication 975 MILLIGRAM(S): at 09:34

## 2024-05-11 RX ADMIN — Medication 1 TABLET(S): at 13:01

## 2024-05-11 RX ADMIN — Medication 975 MILLIGRAM(S): at 03:52

## 2024-05-11 RX ADMIN — Medication 600 MILLIGRAM(S): at 00:13

## 2024-05-11 RX ADMIN — Medication 600 MILLIGRAM(S): at 00:43

## 2024-05-11 RX ADMIN — Medication 200 MICROGRAM(S): at 07:12

## 2024-05-11 RX ADMIN — Medication 600 MILLIGRAM(S): at 13:40

## 2024-05-11 RX ADMIN — Medication 100 MILLIGRAM(S): at 09:38

## 2024-05-11 RX ADMIN — Medication 975 MILLIGRAM(S): at 04:22

## 2024-05-11 RX ADMIN — Medication 600 MILLIGRAM(S): at 13:01

## 2024-05-11 RX ADMIN — Medication 600 MILLIGRAM(S): at 06:53

## 2024-05-11 RX ADMIN — MONTELUKAST 10 MILLIGRAM(S): 4 TABLET, CHEWABLE ORAL at 06:59

## 2024-05-11 RX ADMIN — HEPARIN SODIUM 10000 UNIT(S): 5000 INJECTION INTRAVENOUS; SUBCUTANEOUS at 00:12

## 2024-05-11 RX ADMIN — Medication 600 MILLIGRAM(S): at 06:23

## 2024-05-11 RX ADMIN — Medication 975 MILLIGRAM(S): at 10:10

## 2024-05-11 NOTE — PROGRESS NOTE ADULT - SUBJECTIVE AND OBJECTIVE BOX
Postpartum Note,  Section  She is a  33y woman who is now post-operative day: 2    Subjective:  The patient feels well.  She is ambulating.   She is tolerating regular diet.  She denies nausea and vomiting.  She is voiding.  Her pain is controlled.  She reports normal postpartum bleeding.    Vital Signs Last 24 Hrs  T(C): 36.6 (11 May 2024 06:23), Max: 37 (10 May 2024 17:25)  T(F): 97.8 (11 May 2024 06:23), Max: 98.6 (10 May 2024 17:25)  HR: 101 (11 May 2024 06:23) (88 - 102)  BP: 127/66 (11 May 2024 06:23) (108/60 - 130/69)  BP(mean): --  RR: 18 (11 May 2024 06:23) (18 - 18)  SpO2: 100% (11 May 2024 06:23) (100% - 100%)    Parameters below as of 11 May 2024 06:23  Patient On (Oxygen Delivery Method): room air        Physical exam:  Gen: NAD  Breast: Soft, nontender, not engorged.  Abdomen: Soft, nontender, no distension , firm uterine fundus at umbilicus.  Incision: Clean, dry, and intact with steri strips  Pelvic: Normal lochia noted  Ext: No calf tenderness    LABS:                        8.9    8.88  )-----------( 173      ( 10 May 2024 06:20 )             27.2         Allergies    No Known Allergies    Intolerances      MEDICATIONS  (STANDING):  acetaminophen     Tablet .. 975 milliGRAM(s) Oral <User Schedule>  diphtheria/tetanus/pertussis (acellular) Vaccine (Adacel) 0.5 milliLiter(s) IntraMuscular once  ferrous    sulfate 325 milliGRAM(s) Oral daily  heparin   Injectable 86774 Unit(s) SubCutaneous every 12 hours  ibuprofen  Tablet. 600 milliGRAM(s) Oral every 6 hours  labetalol 100 milliGRAM(s) Oral two times a day  lactated ringers. 1000 milliLiter(s) (125 mL/Hr) IV Continuous <Continuous>  lactated ringers. 1000 milliLiter(s) (75 mL/Hr) IV Continuous <Continuous>  levothyroxine 200 MICROGram(s) Oral daily  montelukast 10 milliGRAM(s) Oral daily  morphine PF Spinal 0.1 milliGRAM(s) IntraThecal. once  prenatal multivitamin 1 Tablet(s) Oral daily  senna 2 Tablet(s) Oral at bedtime    MEDICATIONS  (PRN):  dexAMETHasone  Injectable 4 milliGRAM(s) IV Push every 6 hours PRN Nausea  diphenhydrAMINE 25 milliGRAM(s) Oral every 6 hours PRN Pruritus  diphenhydrAMINE Injectable 25 milliGRAM(s) IV Push every 4 hours PRN Pruritus  lanolin Ointment 1 Application(s) Topical every 6 hours PRN Sore Nipples  magnesium hydroxide Suspension 30 milliLiter(s) Oral two times a day PRN Constipation  naloxone Injectable 0.1 milliGRAM(s) IV Push every 3 minutes PRN For ANY of the following changes in patient status:  A. Breaths Per Minute LESS THAN 10, B. Oxygen saturation LESS THAN 90%, C. Sedation score of 6 for Stop After: 4 Times  ondansetron Injectable 4 milliGRAM(s) IV Push every 6 hours PRN Nausea  oxyCODONE    IR 5 milliGRAM(s) Oral once PRN Moderate to Severe Pain (4-10)  oxyCODONE    IR 5 milliGRAM(s) Oral every 3 hours PRN Moderate to Severe Pain (4-10)  simethicone 80 milliGRAM(s) Chew every 4 hours PRN Gas        Assessment and Plan  POD # 2 s/p  section  Doing well.  Encourage ambulation.

## 2024-05-11 NOTE — PROVIDER CONTACT NOTE (OTHER) - SITUATION
RN noticed leaking incision site. Called MD to access patient at bedside.
visible drainage noted under pressure dressing that was replaced yesterday, no saturation

## 2024-05-11 NOTE — CHART NOTE - NSCHARTNOTEFT_GEN_A_CORE
Examined patient with Dr. Garcia.  Incisional dressing removed and noted to having copious amounts of drainage.  Steristrips removed and replaced with gauze & reinforced with new pressure dressing.  Per attending will have patient return Monday for incisional check.    Silvia GARCIA

## 2024-05-14 ENCOUNTER — EMERGENCY (EMERGENCY)
Facility: HOSPITAL | Age: 34
LOS: 1 days | Discharge: HOME | End: 2024-05-14
Attending: EMERGENCY MEDICINE | Admitting: EMERGENCY MEDICINE
Payer: MEDICAID

## 2024-05-14 VITALS
HEART RATE: 80 BPM | RESPIRATION RATE: 18 BRPM | OXYGEN SATURATION: 100 % | DIASTOLIC BLOOD PRESSURE: 93 MMHG | TEMPERATURE: 98 F | SYSTOLIC BLOOD PRESSURE: 137 MMHG

## 2024-05-14 VITALS
HEART RATE: 84 BPM | HEIGHT: 63 IN | TEMPERATURE: 98 F | SYSTOLIC BLOOD PRESSURE: 138 MMHG | OXYGEN SATURATION: 100 % | DIASTOLIC BLOOD PRESSURE: 86 MMHG | RESPIRATION RATE: 18 BRPM

## 2024-05-14 DIAGNOSIS — Z90.09 ACQUIRED ABSENCE OF OTHER PART OF HEAD AND NECK: Chronic | ICD-10-CM

## 2024-05-14 DIAGNOSIS — Z90.49 ACQUIRED ABSENCE OF OTHER SPECIFIED PARTS OF DIGESTIVE TRACT: Chronic | ICD-10-CM

## 2024-05-14 DIAGNOSIS — Z98.891 HISTORY OF UTERINE SCAR FROM PREVIOUS SURGERY: Chronic | ICD-10-CM

## 2024-05-14 PROBLEM — I10 ESSENTIAL (PRIMARY) HYPERTENSION: Chronic | Status: ACTIVE | Noted: 2024-05-03

## 2024-05-14 PROBLEM — O24.419 GESTATIONAL DIABETES MELLITUS IN PREGNANCY, UNSPECIFIED CONTROL: Chronic | Status: ACTIVE | Noted: 2024-05-03

## 2024-05-14 LAB
ALBUMIN SERPL ELPH-MCNC: 3 G/DL — LOW (ref 3.3–5)
ALP SERPL-CCNC: 91 U/L — SIGNIFICANT CHANGE UP (ref 40–120)
ALT FLD-CCNC: 14 U/L — SIGNIFICANT CHANGE UP (ref 4–33)
ANION GAP SERPL CALC-SCNC: 12 MMOL/L — SIGNIFICANT CHANGE UP (ref 7–14)
APTT BLD: 32.2 SEC — SIGNIFICANT CHANGE UP (ref 24.5–35.6)
AST SERPL-CCNC: 22 U/L — SIGNIFICANT CHANGE UP (ref 4–32)
BASOPHILS # BLD AUTO: 0.03 K/UL — SIGNIFICANT CHANGE UP (ref 0–0.2)
BASOPHILS NFR BLD AUTO: 0.4 % — SIGNIFICANT CHANGE UP (ref 0–2)
BILIRUB SERPL-MCNC: <0.2 MG/DL — SIGNIFICANT CHANGE UP (ref 0.2–1.2)
BUN SERPL-MCNC: 14 MG/DL — SIGNIFICANT CHANGE UP (ref 7–23)
CALCIUM SERPL-MCNC: 9.1 MG/DL — SIGNIFICANT CHANGE UP (ref 8.4–10.5)
CHLORIDE SERPL-SCNC: 103 MMOL/L — SIGNIFICANT CHANGE UP (ref 98–107)
CO2 SERPL-SCNC: 23 MMOL/L — SIGNIFICANT CHANGE UP (ref 22–31)
CREAT SERPL-MCNC: 0.62 MG/DL — SIGNIFICANT CHANGE UP (ref 0.5–1.3)
EGFR: 121 ML/MIN/1.73M2 — SIGNIFICANT CHANGE UP
EOSINOPHIL # BLD AUTO: 0.4 K/UL — SIGNIFICANT CHANGE UP (ref 0–0.5)
EOSINOPHIL NFR BLD AUTO: 5 % — SIGNIFICANT CHANGE UP (ref 0–6)
GLUCOSE SERPL-MCNC: 98 MG/DL — SIGNIFICANT CHANGE UP (ref 70–99)
HCT VFR BLD CALC: 27.6 % — LOW (ref 34.5–45)
HGB BLD-MCNC: 8.9 G/DL — LOW (ref 11.5–15.5)
IANC: 5.24 K/UL — SIGNIFICANT CHANGE UP (ref 1.8–7.4)
IMM GRANULOCYTES NFR BLD AUTO: 0.8 % — SIGNIFICANT CHANGE UP (ref 0–0.9)
INR BLD: 0.92 RATIO — SIGNIFICANT CHANGE UP (ref 0.85–1.18)
LYMPHOCYTES # BLD AUTO: 1.79 K/UL — SIGNIFICANT CHANGE UP (ref 1–3.3)
LYMPHOCYTES # BLD AUTO: 22.4 % — SIGNIFICANT CHANGE UP (ref 13–44)
MCHC RBC-ENTMCNC: 27.7 PG — SIGNIFICANT CHANGE UP (ref 27–34)
MCHC RBC-ENTMCNC: 32.2 GM/DL — SIGNIFICANT CHANGE UP (ref 32–36)
MCV RBC AUTO: 86 FL — SIGNIFICANT CHANGE UP (ref 80–100)
MONOCYTES # BLD AUTO: 0.48 K/UL — SIGNIFICANT CHANGE UP (ref 0–0.9)
MONOCYTES NFR BLD AUTO: 6 % — SIGNIFICANT CHANGE UP (ref 2–14)
NEUTROPHILS # BLD AUTO: 5.24 K/UL — SIGNIFICANT CHANGE UP (ref 1.8–7.4)
NEUTROPHILS NFR BLD AUTO: 65.4 % — SIGNIFICANT CHANGE UP (ref 43–77)
NRBC # BLD: 0 /100 WBCS — SIGNIFICANT CHANGE UP (ref 0–0)
NRBC # FLD: 0 K/UL — SIGNIFICANT CHANGE UP (ref 0–0)
PLATELET # BLD AUTO: 247 K/UL — SIGNIFICANT CHANGE UP (ref 150–400)
POTASSIUM SERPL-MCNC: 4.2 MMOL/L — SIGNIFICANT CHANGE UP (ref 3.5–5.3)
POTASSIUM SERPL-SCNC: 4.2 MMOL/L — SIGNIFICANT CHANGE UP (ref 3.5–5.3)
PROT SERPL-MCNC: 5.8 G/DL — LOW (ref 6–8.3)
PROTHROM AB SERPL-ACNC: 10.4 SEC — SIGNIFICANT CHANGE UP (ref 9.5–13)
RBC # BLD: 3.21 M/UL — LOW (ref 3.8–5.2)
RBC # FLD: 13.5 % — SIGNIFICANT CHANGE UP (ref 10.3–14.5)
SODIUM SERPL-SCNC: 138 MMOL/L — SIGNIFICANT CHANGE UP (ref 135–145)
WBC # BLD: 8 K/UL — SIGNIFICANT CHANGE UP (ref 3.8–10.5)
WBC # FLD AUTO: 8 K/UL — SIGNIFICANT CHANGE UP (ref 3.8–10.5)

## 2024-05-14 PROCEDURE — 99285 EMERGENCY DEPT VISIT HI MDM: CPT | Mod: 25

## 2024-05-14 PROCEDURE — 74177 CT ABD & PELVIS W/CONTRAST: CPT | Mod: 26,MC

## 2024-05-14 RX ORDER — CEPHALEXIN 500 MG
500 CAPSULE ORAL ONCE
Refills: 0 | Status: COMPLETED | OUTPATIENT
Start: 2024-05-14 | End: 2024-05-14

## 2024-05-14 RX ORDER — ACETAMINOPHEN 500 MG
1000 TABLET ORAL ONCE
Refills: 0 | Status: COMPLETED | OUTPATIENT
Start: 2024-05-14 | End: 2024-05-14

## 2024-05-14 RX ORDER — CEPHALEXIN 500 MG
1 CAPSULE ORAL
Qty: 28 | Refills: 0
Start: 2024-05-14 | End: 2024-05-20

## 2024-05-14 RX ADMIN — Medication 400 MILLIGRAM(S): at 05:37

## 2024-05-14 RX ADMIN — Medication 500 MILLIGRAM(S): at 05:37

## 2024-05-14 NOTE — ED PROVIDER NOTE - CLINICAL SUMMARY MEDICAL DECISION MAKING FREE TEXT BOX
32 y/o F with PMHx of HTN presents to ED for evaluation of drainage and bleeding from surgical site. Had uncomplicated scheduled  on . Pregnancy was complicated by pre-eclampsia and gestational diabetes. With increasing drainage and bleeding. Post operative site mildly erythematous with oozsing. Plan to check labs to evaluate for anemia given bleeding and recent surgery and CT abd/pelvis to evaluate for hematoma. Suspect possible seroma.

## 2024-05-14 NOTE — ED ADULT TRIAGE NOTE - CHIEF COMPLAINT QUOTE
Pt c/o bleeding to C section incision site x40 minutes. Had C section on 5/9 with no complications. Denies injury/trauma to site. Well appearing. Pt c/o bleeding to C section incision site x40 minutes. Had C section on 5/9 with no complications. Denies injury/trauma to site. Well appearing. As per L&D margarette, pt to remain in ED

## 2024-05-14 NOTE — ED PROVIDER NOTE - ATTENDING CONTRIBUTION TO CARE
34 y/o F with h/o pre-eclampsia on labetalol, gestational DM, s/p   here with bleeding from incision site.  Pt reports she had bleeding prior to discharge, seen for wound check yesterday.  She states tonight her dressing was soaked with blood.  No recent injury or trauma to site.  No fever, chills, n/v/c/d.  OBGYN Dr Jeane Castellanos    Well appearing, lying comfortably in stretcher, awake and alert, nontoxic.  AF/VSS.  Lungs cta bl.  Cards nl S1/S2, RRR, no MRG.  Abd soft ntnd (+)lower  incision clean and dry with mild dehiscence to L lateral aspect with scant bleeding.  There is no surrounding redness, induration, fluctuance.    Suspect underlying incisional hematoma/seroma.  Will obtain imaging to eval for hematoma/bleeding, check h/h, obgyn consult for post-op wound check, reassess.

## 2024-05-14 NOTE — ED ADULT NURSE NOTE - OBJECTIVE STATEMENT
Pt received from triage on stretcher w/  at the bedside, A/Ox4 answers all questions appropriately. C/O Bleeding  site x 1 hour prior to arrival to our facility.  on . Pt denies trauma to area since procedure. Pt denies chest pain and SOB during initial assessment, breathing is even and unlabored on room air. Abdomen is soft and non-distended, non-tender to touch. Pt ambulates independently at baseline, moves all four extremities on command, skin is intact. Pt resting comfortably at the  bedside, no apparent visible acute distress noted on initial assessment. Vital signs stable, NKA to medications. PMHx Hypothyroidism, Preeclampsia, Gestational Diabetes. Pending lab results and CT imaging.

## 2024-05-14 NOTE — ED PROVIDER NOTE - PROGRESS NOTE DETAILS
Joanna PGY1: contacted OBGYN given recent procedure. to see patient after CT results Joanna PGY1: will d/c with keflex per GYN recs. pt has f/u with outpatient OBGYN

## 2024-05-14 NOTE — ED PROVIDER NOTE - NSFOLLOWUPINSTRUCTIONS_ED_ALL_ED_FT
Today you were seen in the ED for evaluation of your  scar.     A copy of your results are attached in this document below. There is no evidence of deep infection in your abdomen.     We recommend following up with your OBGYN provider within the next 7 days.     A prescription has been sent to your pharmacy electronically for antibiotics. Please take this as prescribed and finish the entire course.     SEEK IMMEDIATE MEDICAL CARE IF YOU HAVE ANY OF THE FOLLOWING SYMPTOMS: severe chest pain, difficulty breathing, fainting, fevers that persist despite taking medications over the counter, vomiting blood, dark or bloody stools, inability to tolerate eating and drinking food by mouth

## 2024-05-14 NOTE — ED PROVIDER NOTE - OBJECTIVE STATEMENT
32 y/o F with PMHx of HTN presents to ED for evaluation of drainage and bleeding from surgical site. Had uncomplicated scheduled  on . Pregnancy was complicated by pre-eclampsia and gestational diabetes. Was discharged from hospital on . On day of discharge, was noted to have drainage and oozing from  scar. The wound was dressed and she followed up in the office on  in the am. During follow up visit, wound appeared clean and dry, unable to express anything from site. Last night around 10pm, felt a sudden gush of fluid from wound. Found the bandage and her clothes saturated in blood. Applied pressure and stopped the bleeding prior to getting to the ED. Also endorsing abdominal distention. Denies fevers, headaches, vision changes, severe vaginal bleeding, nausea, vomiting, urinary symptoms or back pain. Blood pressure has been in the 120s/130s systolic since leaving the hospital. NKDA. Taking Tylenol and Motrin post operatively.

## 2024-05-14 NOTE — ED PROVIDER NOTE - PHYSICAL EXAMINATION
Gen: well appearing, in no acute distress   Head: normal appearing  HEENT: normal conjunctiva, oral mucosa moist, vision grossly intact   Lung: no respiratory distress, speaking in full sentences, CTA b/l, no wheeze, crackles or rhonchi   CV: regular rate and rhythm, no murmurs  Abd: soft, non distended, not firm, TTP over  scar   MSK: no visible deformities, ambulating without difficulty   Neuro: No focal deficits, AAOx3  Skin: Warm, no rashes, low horizontal  incision site is mildly erythematous, left 1/4 of wound with mild oozing of blood, gauze currently in place is saturated with blood, no fowl smelling discharge from wound   Psych: normal affect

## 2024-05-14 NOTE — CONSULT NOTE ADULT - ASSESSMENT
33 yof who is POD#5 from repeat  coming to the ED with drainage from her incision. Concern for possible seroma/hematoma, less suspicion for fascial dehiscence. CT pending. Labs reveals no leukocytosis and patient without fevers, low suspicion for infection such as cellulitis.       - f/u CTAP  - I&D and packing required for wound  - Keflex  - will require home care for continued wound care      D/w Dr. Lucio Camacho, PGY2 33 yof who is POD#5 from repeat  coming to the ED with drainage from her incision. Concern for possible seroma/hematoma, less suspicion for fascial dehiscence. CT pending. Labs reveals no leukocytosis and patient without fevers, low suspicion for infection such as cellulitis.       - f/u CTAP  - I&D and packing required for wound  - Keflex  - will require home care for continued wound care      D/w Dr. Lucio Camacho, PGY2      ADDENDUM  Bedside wound packing performed with Dr. Valdes. Aquacel packing, x1 strip packed into incision.   CT A/P shows no evidence of intraabdominal fluid collection and shows intact fascia.   Patient instructed to f/u with Dr. Mendez outpatient.       Seen with Dr. Lucio Camacho, PGY2 33 yof who is POD#5 from repeat  coming to the ED with drainage from her incision. Concern for possible seroma/hematoma, less suspicion for fascial dehiscence. CT pending. Labs reveals no leukocytosis and patient without fevers, low suspicion for infection such as cellulitis.       - f/u CTAP  - I&D and packing required for wound  - Keflex x7 days  - will require home care for continued wound care      D/w Dr. Lucio Camacho, PGY2      ADDENDUM  Bedside wound packing performed with Dr. Valdes. Aquacel packing, x1 strip packed into incision.   CT A/P shows no evidence of intraabdominal fluid collection and shows intact fascia.   Patient instructed to f/u with Dr. Mendez outpatient.       Seen with Dr. Lucio Camacho, PGY2 33 yof who is POD#5 from repeat  coming to the ED with drainage from her incision. Concern for possible seroma/hematoma, less suspicion for fascial dehiscence. CT pending. Labs reveals no leukocytosis and patient without fevers, low suspicion for infection such as cellulitis.       - f/u CTAP  - I&D and packing required for wound  - Keflex x5 days  - will require home care for continued wound care      D/w Dr. Lucio Camacho, PGY2      ADDENDUM  Bedside wound packing performed with Dr. Valdes. Aquacel packing, x1 strip packed into incision.   CT A/P shows no evidence of intraabdominal fluid collection and shows intact fascia.   Patient instructed to f/u with Dr. Mendez outpatient.       Seen with Dr. Lucio Camacho, PGY2

## 2024-05-14 NOTE — CONSULT NOTE ADULT - SUBJECTIVE AND OBJECTIVE BOX
GYN Consult Note    33y  who is POD#5 from repeat  on 24 presents to the ED with complaints of blood "gushing" from her incision earlier today around 1am. Patient states that she was seen in the hospital by her OB (Dr. Garcia) on 24 when she had a similar episode of leaking from her  incision. At that time, gauze and pressure dressing was placed over the incision. She was then seen in outpatient office earlier on Monday () during which time patient describes Dr. Garcia as having pushed on her wound. As there was no further drainage, she was sent home. At home, she was changing positions in bed when she noticed the gush. Denies feeling any "pop" or any pain. Denies any fevers, chills, nausea, vomiting, or increased abdominal pain, although she notes mild pain which she's had since her surgery. At the time of drainage, she also noticed that the belly above the incision seemed more "swollen" but she denies bloating or feeling gassy.    Of note, patient reports similar episode of wound complication after her first . States she required packing and antibiotics after her wound opened up.        OB/GYN HISTORY:   Physician: Dr. Garcia  Ob:  x3 (, , )  Gyn: Denies fibroids, cysts, PCOS, endometriosis, or history of genital lesions   PMH: asthma, hypothyroidism  PSH:  x3, lsc-cholecystectomy   Meds: synthroid 200mcg   Allergies: NKDA      Vital Signs Last 24 Hrs  T(C): 36.4 (14 May 2024 01:46), Max: 36.4 (14 May 2024 01:46)  T(F): 97.6 (14 May 2024 01:46), Max: 97.6 (14 May 2024 01:46)  HR: 84 (14 May 2024 01:46) (84 - 84)  BP: 138/86 (14 May 2024 01:46) (138/86 - 138/86)  BP(mean): --  RR: 18 (14 May 2024 01:46) (18 - 18)  SpO2: 100% (14 May 2024 01:46) (100% - 100%)    Parameters below as of 14 May 2024 01:46  Patient On (Oxygen Delivery Method): room air        PHYSICAL EXAM: Chaperoned w/  at bedside.   Gen: NAD, alert and oriented x 3  Cardiovascular: regular   Respiratory: breathing comfortably on RA  Abd: soft, non tender, non-distended  Incision: Pfannenstiel incision with granulation in middle; 2cm defect on left side of wound, probed with q-tip with defect measuring approx 8x2cm, no obvious fascial defect; no warmth or erythema  Extremities: NTBL  Skin: warm and well perfused      LABS:                        8.9    8.00  )-----------( 247      ( 14 May 2024 03:30 )             27.6     05-14    138  |  103  |  14  ----------------------------<  98  4.2   |  23  |  0.62    Ca    9.1      14 May 2024 03:30    TPro  5.8<L>  /  Alb  3.0<L>  /  TBili  <0.2  /  DBili  x   /  AST  22  /  ALT  14  /  AlkPhos  91  05-14    PT/INR - ( 14 May 2024 03:30 )   PT: 10.4 sec;   INR: 0.92 ratio         PTT - ( 14 May 2024 03:30 )  PTT:32.2 sec  Urinalysis Basic - ( 14 May 2024 03:30 )    Color: x / Appearance: x / SG: x / pH: x  Gluc: 98 mg/dL / Ketone: x  / Bili: x / Urobili: x   Blood: x / Protein: x / Nitrite: x   Leuk Esterase: x / RBC: x / WBC x   Sq Epi: x / Non Sq Epi: x / Bacteria: x        RADIOLOGY & ADDITIONAL STUDIES:   GYN Consult Note    33y  who is POD#5 from repeat  on 24 presents to the ED with complaints of blood "gushing" from her incision earlier today around 1am. Patient states that she was seen in the hospital by her OB (Dr. Garcia) on 24 when she had a similar episode of leaking from her  incision. At that time, gauze and pressure dressing was placed over the incision. She was then seen in outpatient office earlier on Monday () during which time patient describes Dr. Garcia as having pushed on her wound. As there was no further drainage, she was sent home. At home, she was changing positions in bed when she noticed the gush. Denies feeling any "pop" or any pain. Denies any fevers, chills, nausea, vomiting, or increased abdominal pain, although she notes mild pain which she's had since her surgery. At the time of drainage, she also noticed that the belly above the incision seemed more "swollen" but she denies bloating or feeling gassy.    Of note, patient reports similar episode of wound complication after her first . States she required packing and antibiotics after her wound opened up.        OB/GYN HISTORY:   Physician: Dr. Garcia  Ob:  x3 (, , )  Gyn: Denies fibroids, cysts, PCOS, endometriosis, or history of genital lesions   PMH: asthma, hypothyroidism  PSH:  x3, lsc-cholecystectomy   Meds: synthroid 200mcg   Allergies: NKDA      Vital Signs Last 24 Hrs  T(C): 36.4 (14 May 2024 01:46), Max: 36.4 (14 May 2024 01:46)  T(F): 97.6 (14 May 2024 01:46), Max: 97.6 (14 May 2024 01:46)  HR: 84 (14 May 2024 01:46) (84 - 84)  BP: 138/86 (14 May 2024 01:46) (138/86 - 138/86)  BP(mean): --  RR: 18 (14 May 2024 01:46) (18 - 18)  SpO2: 100% (14 May 2024 01:46) (100% - 100%)    Parameters below as of 14 May 2024 01:46  Patient On (Oxygen Delivery Method): room air        PHYSICAL EXAM: Chaperoned w/  at bedside.   Gen: NAD, alert and oriented x 3  Cardiovascular: regular   Respiratory: breathing comfortably on RA  Abd: soft, non tender, non-distended  Incision: Pfannenstiel incision with granulation in middle; 2cm defect on left side of wound, probed with q-tip with defect measuring approx 6x2cm, no obvious fascial defect; no warmth or erythema  Extremities: NTBL  Skin: warm and well perfused      LABS:                        8.9    8.00  )-----------( 247      ( 14 May 2024 03:30 )             27.6     05-    138  |  103  |  14  ----------------------------<  98  4.2   |  23  |  0.62    Ca    9.1      14 May 2024 03:30    TPro  5.8<L>  /  Alb  3.0<L>  /  TBili  <0.2  /  DBili  x   /  AST  22  /  ALT  14  /  AlkPhos  91  -14    PT/INR - ( 14 May 2024 03:30 )   PT: 10.4 sec;   INR: 0.92 ratio         PTT - ( 14 May 2024 03:30 )  PTT:32.2 sec  Urinalysis Basic - ( 14 May 2024 03:30 )    Color: x / Appearance: x / SG: x / pH: x  Gluc: 98 mg/dL / Ketone: x  / Bili: x / Urobili: x   Blood: x / Protein: x / Nitrite: x   Leuk Esterase: x / RBC: x / WBC x   Sq Epi: x / Non Sq Epi: x / Bacteria: x        RADIOLOGY & ADDITIONAL STUDIES:  < from: CT Abdomen and Pelvis w/ IV Cont (24 @ 04:32) >  FINDINGS:  LOWER CHEST: Within normal limits.    LIVER: Hepatomegaly.  BILE DUCTS: Normal caliber.  GALLBLADDER: Surgically absent.  SPLEEN: Within normal limits.  PANCREAS: Within normal limits.  ADRENALS: Within normal limits.  KIDNEYS/URETERS: Kidneys enhance symmetrically without hydronephrosis or   focal renal parenchymal lesion.    BLADDER: Within normal limits.  REPRODUCTIVE ORGANS: Enlarged post gravid uterus. Mild thickening of the   endometrium and small focusof air, likely post surgical. Adnexa are   unremarkable.    BOWEL: No bowel obstruction. Appendix is normal.  PERITONEUM: Small volume pelvic free fluid. No pneumoperitoneum or   loculated fluid collection to suggest abscess. No large intra-abdominal  or pelvic hematoma.  VESSELS: Within normal limits.  RETROPERITONEUM/LYMPH NODES: No lymphadenopathy.  ABDOMINAL WALL: Tiny fat-containing umbilical hernia. Generalized soft   tissue edema. Low transverse  section scar with complex fluid   along the  section scar in the subcutaneous fat measuring 13.5 x   1.9 x 1.9 cm.  BONES: Mild degenerative changes of the spine.    IMPRESSION:  Enlarged post gravid uterus. Mild thickening of the endometrium and small   focus of air, likely postsurgical. Small volume pelvic free fluid. No   large intra-abdominal or pelvic hematoma. Low transverse  section   scar with complex fluid along the  section scar in the   subcutaneous fat measuring 13.5 x 1.9 x 1.9 cm. Correlate with clinical   exam.      --- End of Report ---    < end of copied text >

## 2024-05-14 NOTE — ED ADULT NURSE NOTE - CHIEF COMPLAINT QUOTE
Pt c/o bleeding to C section incision site x40 minutes. Had C section on 5/9 with no complications. Denies injury/trauma to site. Well appearing. As per L&D margarette, pt to remain in ED

## 2024-05-14 NOTE — ED ADULT NURSE REASSESSMENT NOTE - NS ED NURSE REASSESS COMMENT FT1
Break RN: Pt laying in bed, respirations even and unlabored, VS in flow sheet. Pt c/o lower abdominal pain rated 7/10, Joanna SNYDER made aware, pt medicate per MD orders. Bed in lowest position, safety maintained.

## 2024-05-14 NOTE — ED PROVIDER NOTE - PATIENT PORTAL LINK FT
You can access the FollowMyHealth Patient Portal offered by [facility] by registering at the following website: [email or web address]. By joining [facility]’s FollowMyHealth portal, you will also be able to view your health information using other applications (apps) compatible with our system.

## 2024-05-16 LAB
-  CLINDAMYCIN: SIGNIFICANT CHANGE UP
-  ERYTHROMYCIN: SIGNIFICANT CHANGE UP
-  GENTAMICIN: SIGNIFICANT CHANGE UP
-  OXACILLIN: SIGNIFICANT CHANGE UP
-  RIFAMPIN: SIGNIFICANT CHANGE UP
-  TETRACYCLINE: SIGNIFICANT CHANGE UP
-  TRIMETHOPRIM/SULFAMETHOXAZOLE: SIGNIFICANT CHANGE UP
-  VANCOMYCIN: SIGNIFICANT CHANGE UP
CULTURE RESULTS: ABNORMAL
METHOD TYPE: SIGNIFICANT CHANGE UP
ORGANISM # SPEC MICROSCOPIC CNT: ABNORMAL
ORGANISM # SPEC MICROSCOPIC CNT: ABNORMAL
SPECIMEN SOURCE: SIGNIFICANT CHANGE UP

## 2024-05-17 LAB — SURGICAL PATHOLOGY STUDY: SIGNIFICANT CHANGE UP

## 2025-03-20 NOTE — DISCHARGE NOTE OB - TEMPERATURE GREATER THAN 100.0  F ORALLY
Lactation round per patient request. Baby is ready to nurse. Baby is very fussy at the breast. Demonstrated to patient how to hand express and offer colostrum to baby. Large drops expressed. Gloved finger used to help soothe baby at breast. Once calm, demonstrated to mother how to aim nipple at babys mouth for a deep latch. Baby latches deep, multiple audible swallows noted. Educated on signs of a good feed and what to monitor for. Reviewed breastfeeding positions. Encouraged to call out.    Statement Selected
